# Patient Record
Sex: FEMALE | Race: WHITE | NOT HISPANIC OR LATINO | Employment: FULL TIME | URBAN - METROPOLITAN AREA
[De-identification: names, ages, dates, MRNs, and addresses within clinical notes are randomized per-mention and may not be internally consistent; named-entity substitution may affect disease eponyms.]

---

## 2017-03-31 ENCOUNTER — TELEPHONE (OUTPATIENT)
Dept: NEUROLOGY | Facility: CLINIC | Age: 22
End: 2017-03-31

## 2018-04-06 PROBLEM — F34.1 PRIMARY DYSTHYMIA: Status: ACTIVE | Noted: 2018-04-06

## 2018-04-09 ENCOUNTER — HOSPITAL ENCOUNTER (EMERGENCY)
Facility: HOSPITAL | Age: 23
Discharge: HOME | End: 2018-04-09
Attending: EMERGENCY MEDICINE

## 2018-04-09 VITALS
HEIGHT: 66 IN | SYSTOLIC BLOOD PRESSURE: 123 MMHG | OXYGEN SATURATION: 100 % | HEART RATE: 88 BPM | RESPIRATION RATE: 18 BRPM | BODY MASS INDEX: 20.09 KG/M2 | DIASTOLIC BLOOD PRESSURE: 74 MMHG | TEMPERATURE: 99 F | WEIGHT: 125 LBS

## 2018-04-09 DIAGNOSIS — A59.01 TRICHOMONAS VAGINITIS: Primary | ICD-10-CM

## 2018-04-09 DIAGNOSIS — N39.0 URINARY TRACT INFECTION WITHOUT HEMATURIA, SITE UNSPECIFIED: ICD-10-CM

## 2018-04-09 LAB
B-HCG UR QL: NEGATIVE
BACTERIA #/AREA URNS HPF: ABNORMAL /HPF
BILIRUB UR QL STRIP: NEGATIVE
CLARITY UR: ABNORMAL
COLOR UR: YELLOW
GLUCOSE UR QL STRIP: NEGATIVE
HGB UR QL STRIP: ABNORMAL
HYALINE CASTS #/AREA URNS LPF: ABNORMAL /LPF
KETONES UR QL STRIP: ABNORMAL
LEUKOCYTE ESTERASE UR QL STRIP: ABNORMAL
MICROSCOPIC COMMENT: ABNORMAL
NITRITE UR QL STRIP: NEGATIVE
PH UR STRIP: 6 [PH] (ref 5–8)
PROT UR QL STRIP: ABNORMAL
RBC #/AREA URNS HPF: 4 /HPF (ref 0–4)
SP GR UR STRIP: 1.02 (ref 1–1.03)
SQUAMOUS #/AREA URNS HPF: 25 /HPF
TRICHOMONAS UR QL MICRO: ABNORMAL
URN SPEC COLLECT METH UR: ABNORMAL
UROBILINOGEN UR STRIP-ACNC: NEGATIVE EU/DL
WBC #/AREA URNS HPF: 100 /HPF (ref 0–5)
WBC CLUMPS URNS QL MICRO: ABNORMAL

## 2018-04-09 PROCEDURE — 81025 URINE PREGNANCY TEST: CPT

## 2018-04-09 PROCEDURE — 81000 URINALYSIS NONAUTO W/SCOPE: CPT

## 2018-04-09 PROCEDURE — 96372 THER/PROPH/DIAG INJ SC/IM: CPT

## 2018-04-09 PROCEDURE — 63600175 PHARM REV CODE 636 W HCPCS: Mod: JG | Performed by: EMERGENCY MEDICINE

## 2018-04-09 PROCEDURE — 99283 EMERGENCY DEPT VISIT LOW MDM: CPT | Mod: 25

## 2018-04-09 RX ORDER — AZITHROMYCIN 250 MG/1
500 TABLET, FILM COATED ORAL DAILY
Qty: 5 TABLET | Refills: 0 | Status: SHIPPED | OUTPATIENT
Start: 2018-04-09 | End: 2020-07-13

## 2018-04-09 RX ORDER — METRONIDAZOLE 500 MG/1
500 TABLET ORAL 3 TIMES DAILY
Qty: 21 TABLET | Refills: 0 | Status: SHIPPED | OUTPATIENT
Start: 2018-04-09 | End: 2018-04-16

## 2018-04-09 RX ADMIN — PENICILLIN G BENZATHINE 1.2 MILLION UNITS: 1200000 INJECTION, SUSPENSION INTRAMUSCULAR at 09:04

## 2018-04-09 NOTE — ED PROVIDER NOTES
"Encounter Date: 4/9/2018       History     Chief Complaint   Patient presents with    Sore Throat     bumps in back of throat. Pt states "I have either strep or STD." (since saturday)    Female  Problem     Pt states "Bumps and lesions noted to vaginal area. I have a yellowish/grey discharge." onset Tuesday spread over the past 6 days.      Pt is 21 yo WF with c/o sore throat and vaginal pain with discharge. Discharge is gray/white with an odur. States she thinks she might have an STD after having unprotected intercourse a week ago. Pt on adm has a LG fever.           Review of patient's allergies indicates:  No Known Allergies  No past medical history on file.  No past surgical history on file.  No family history on file.  Social History   Substance Use Topics    Smoking status: Not on file    Smokeless tobacco: Not on file    Alcohol use Not on file     Review of Systems   Constitutional: Positive for fever.   HENT: Positive for sore throat.    Genitourinary: Positive for dyspareunia, vaginal discharge and vaginal pain.   All other systems reviewed and are negative.      Physical Exam     Initial Vitals [04/09/18 0844]   BP Pulse Resp Temp SpO2   (!) 119/93 108 18 99.1 °F (37.3 °C) 100 %      MAP       101.67         Physical Exam    Nursing note and vitals reviewed.  Constitutional: She appears well-developed and well-nourished.   HENT:   Head: Normocephalic and atraumatic.   Right Ear: External ear normal.   Left Ear: External ear normal.   Nose: Nose normal.   Mouth/Throat: Mucous membranes are normal. No oral lesions. Normal dentition. Oropharyngeal exudate, posterior oropharyngeal edema and posterior oropharyngeal erythema present.       Eyes: Conjunctivae and EOM are normal. Pupils are equal, round, and reactive to light.   Neck: Normal range of motion. Neck supple.   Cardiovascular: Normal rate, regular rhythm, normal heart sounds and intact distal pulses.   No murmur heard.  Pulmonary/Chest: Breath " sounds normal.   Abdominal: Soft. Bowel sounds are normal. There is no tenderness.   Genitourinary:       Pelvic exam was performed with patient in the knee-chest position. There is tenderness on the right labia. There is tenderness on the left labia.   Musculoskeletal: Normal range of motion.   Neurological: She is alert and oriented to person, place, and time. She has normal strength and normal reflexes.   Skin: Skin is warm and dry. Capillary refill takes less than 2 seconds.   Psychiatric: She has a normal mood and affect. Her behavior is normal. Judgment and thought content normal.         ED Course   Procedures  Labs Reviewed - No data to display                               Clinical Impression:   The primary encounter diagnosis was Trichomonas vaginitis. A diagnosis of Urinary tract infection without hematuria, site unspecified was also pertinent to this visit.                           Zafar Raymundo MD  04/09/18 [id number]

## 2018-09-13 ENCOUNTER — TELEPHONE (OUTPATIENT)
Dept: OBSTETRICS AND GYNECOLOGY | Facility: CLINIC | Age: 23
End: 2018-09-13

## 2018-09-13 NOTE — TELEPHONE ENCOUNTER
Returned call to number [phone] pt was not available I was given another number [phone] this number was busy.  I also tried to call mobile lucius cronin on demographics but there is no answer.              ----- Message from Jimena Carlisle sent at 9/13/2018 11:46 AM CDT -----  Contact: pt   Can the clinic reply in [facility]: No     Who Called: DARRIUS HOFFMANN [[id number]]     Date of Positive Preg Test:  6/29/18     1st day of Last Menstrual Cycle: 4/24/18     List Any Difficulties:  None      What Number to Call Back: [phone].

## 2018-09-18 ENCOUNTER — TELEPHONE (OUTPATIENT)
Dept: OBSTETRICS AND GYNECOLOGY | Facility: CLINIC | Age: 23
End: 2018-09-18

## 2018-09-18 NOTE — TELEPHONE ENCOUNTER
Returned call to pt.  Left vm message advising pt to call clinic       ----- Message -----  From: Nga Piper  Sent: 9/14/2018   3:35 PM  To: [facility] Clinical Staff    Name of Who is Calling: Diane      What is the request in detail: Patient was returning a missed call back from the staff      Can the clinic reply by [facility]: no      What Number to Call Back if not in [facility]: [phone]

## 2019-03-05 ENCOUNTER — HOSPITAL ENCOUNTER (EMERGENCY)
Facility: HOSPITAL | Age: 24
Discharge: HOME | End: 2019-03-05
Attending: EMERGENCY MEDICINE
Payer: MEDICAID

## 2019-03-05 VITALS
DIASTOLIC BLOOD PRESSURE: 85 MMHG | BODY MASS INDEX: 21.86 KG/M2 | TEMPERATURE: 98 F | HEIGHT: 66 IN | RESPIRATION RATE: 18 BRPM | WEIGHT: 136 LBS | HEART RATE: 85 BPM | OXYGEN SATURATION: 97 % | SYSTOLIC BLOOD PRESSURE: 113 MMHG

## 2019-03-05 DIAGNOSIS — J02.0 PHARYNGITIS, STREPTOCOCCAL, ACUTE: Primary | ICD-10-CM

## 2019-03-05 LAB — DEPRECATED S PYO AG THROAT QL EIA: POSITIVE

## 2019-03-05 PROCEDURE — 99284 EMERGENCY DEPT VISIT MOD MDM: CPT | Mod: 25

## 2019-03-05 PROCEDURE — 63600175 PHARM REV CODE 636 W HCPCS: Mod: JG | Performed by: EMERGENCY MEDICINE

## 2019-03-05 PROCEDURE — 87880 STREP A ASSAY W/OPTIC: CPT

## 2019-03-05 PROCEDURE — 96372 THER/PROPH/DIAG INJ SC/IM: CPT

## 2019-03-05 RX ORDER — PREDNISONE 10 MG/1
40 TABLET ORAL
Status: COMPLETED | OUTPATIENT
Start: 2019-03-05 | End: 2019-03-05

## 2019-03-05 RX ADMIN — PENICILLIN G BENZATHINE 2.4 MILLION UNITS: 1200000 INJECTION, SUSPENSION INTRAMUSCULAR at 09:03

## 2019-03-05 RX ADMIN — PREDNISONE 40 MG: 10 TABLET ORAL at 09:03

## 2019-03-05 NOTE — ED PROVIDER NOTES
Encounter Date: 3/5/2019       History     Chief Complaint   Patient presents with    Sore Throat     A 23-year-old female complains of sore throat onset in the past 24-36 hr  Reports some ear discomfort  Reports some congestion  No significant cough  No abdominal pain nausea vomiting diarrhea  She is 1 month postpartum    She had known exposure strep pharyngitis some 2-3 weeks ago              Review of patient's allergies indicates:  No Known Allergies  History reviewed. No pertinent past medical history.  Past Surgical History:   Procedure Laterality Date    APPENDECTOMY       No family history on file.  Social History     Tobacco Use    Smoking status: Never Smoker    Smokeless tobacco: Never Used   Substance Use Topics    Alcohol use: No     Frequency: Never    Drug use: No     Review of Systems   Constitutional: Negative for activity change, appetite change, chills, diaphoresis, fatigue and fever.   HENT: Positive for congestion, ear pain, sneezing and sore throat. Negative for dental problem, drooling, ear discharge, facial swelling, mouth sores, nosebleeds, postnasal drip, rhinorrhea, sinus pressure, sinus pain, trouble swallowing and voice change.    Eyes: Negative for discharge and redness.   Respiratory: Negative.    Cardiovascular: Negative.    Gastrointestinal: Negative.    Genitourinary: Negative.    Musculoskeletal: Negative.    Skin: Negative.    Neurological: Negative for headaches.   Hematological: Negative.    All other systems reviewed and are negative.      Physical Exam     Initial Vitals [03/05/19 0829]   BP Pulse Resp Temp SpO2   113/85 85 18 97.8 °F (36.6 °C) 97 %      MAP       --         Physical Exam    Nursing note and vitals reviewed.  Constitutional: She appears well-developed and well-nourished. She is not diaphoretic. No distress.   HENT:   Head: Normocephalic and atraumatic.   Right Ear: Tympanic membrane, external ear and ear canal normal. Tympanic membrane is not  erythematous and not bulging. No middle ear effusion.   Left Ear: Tympanic membrane, external ear and ear canal normal. Tympanic membrane is not erythematous and not bulging.  No middle ear effusion.   Nose: Nose normal.   Mouth/Throat: Uvula is midline and mucous membranes are normal. Posterior oropharyngeal edema and posterior oropharyngeal erythema present. No oropharyngeal exudate.   Eyes: Conjunctivae and EOM are normal. Pupils are equal, round, and reactive to light. Right eye exhibits no discharge. Left eye exhibits no discharge. No scleral icterus.   Neck: Normal range of motion. Neck supple.   Cardiovascular: Normal rate, regular rhythm, normal heart sounds and intact distal pulses. Exam reveals no gallop and no friction rub.    No murmur heard.  Pulmonary/Chest: Breath sounds normal. No respiratory distress. She has no wheezes. She has no rhonchi. She has no rales.   Abdominal: Soft. Bowel sounds are normal. She exhibits no distension and no mass. There is no tenderness. There is no rebound and no guarding.   Musculoskeletal: Normal range of motion. She exhibits no edema or tenderness.   Lymphadenopathy:     She has no cervical adenopathy.   Neurological: She is alert and oriented to person, place, and time. She has normal strength. No cranial nerve deficit or sensory deficit.   Skin: Skin is warm and dry. Capillary refill takes less than 2 seconds. No rash noted. No erythema. No pallor.   Psychiatric: She has a normal mood and affect. Her behavior is normal. Judgment and thought content normal.         ED Course   Procedures  Labs Reviewed   THROAT SCREEN, RAPID - Abnormal; Notable for the following components:       Result Value    Rapid Strep A Screen Positive (*)     All other components within normal limits          Imaging Results    None                  Treated with Bicillin 2.4 million units IM  Stable discharge as per AVS              Clinical Impression:       ICD-10-CM ICD-9-CM   1.  Pharyngitis, streptococcal, acute J02.0 034.0         Disposition:   Disposition: Discharged  Condition: Stable                        Kevin Gomez MD  03/05/19 0905

## 2019-03-05 NOTE — DISCHARGE INSTRUCTIONS
Bicillin 2.4 million units IM x1  Prednisone 40 mg x1    Review strep pharyngitis discharge instruction sheet    Return to the emergency department if any acute worsening

## 2020-03-09 LAB
CHLAMYDIA TRACHOMATIS AMP DNA: NOT DETECTED
TRICHOMONAS VAGINALIS: DETECTED

## 2020-03-10 LAB
HEP C VIRUS AB: NON REACTIVE
HIV: NON REACTIVE

## 2020-09-12 ENCOUNTER — HOSPITAL ENCOUNTER (INPATIENT)
Facility: HOSPITAL | Age: 25
LOS: 2 days | Discharge: HOME | DRG: 440 | End: 2020-09-14
Attending: INTERNAL MEDICINE | Admitting: FAMILY MEDICINE
Payer: COMMERCIAL

## 2020-09-12 DIAGNOSIS — K85.00 IDIOPATHIC ACUTE PANCREATITIS WITHOUT INFECTION OR NECROSIS: Primary | ICD-10-CM

## 2020-09-12 DIAGNOSIS — R11.2 NAUSEA & VOMITING: ICD-10-CM

## 2020-09-12 DIAGNOSIS — K85.90 PANCREATITIS, ACUTE: ICD-10-CM

## 2020-09-12 LAB
ALBUMIN SERPL BCP-MCNC: 4.2 G/DL (ref 3.5–5.2)
ALP SERPL-CCNC: 46 U/L (ref 55–135)
ALT SERPL W/O P-5'-P-CCNC: 15 U/L (ref 10–44)
AMYLASE SERPL-CCNC: 154 U/L (ref 20–110)
ANION GAP SERPL CALC-SCNC: 16 MMOL/L (ref 8–16)
AST SERPL-CCNC: 19 U/L (ref 10–40)
B-HCG UR QL: NEGATIVE
BASOPHILS # BLD AUTO: 0.02 K/UL (ref 0–0.2)
BASOPHILS NFR BLD: 0.3 % (ref 0–1.9)
BILIRUB SERPL-MCNC: 0.4 MG/DL (ref 0.1–1)
BILIRUB UR QL STRIP: NEGATIVE
BUN SERPL-MCNC: 15 MG/DL (ref 6–20)
CALCIUM SERPL-MCNC: 9.2 MG/DL (ref 8.7–10.5)
CHLORIDE SERPL-SCNC: 106 MMOL/L (ref 95–110)
CLARITY UR: CLEAR
CO2 SERPL-SCNC: 22 MMOL/L (ref 23–29)
COLOR UR: YELLOW
CREAT SERPL-MCNC: 0.8 MG/DL (ref 0.5–1.4)
CRP SERPL-MCNC: 0.12 MG/DL (ref 0–0.75)
DIFFERENTIAL METHOD: ABNORMAL
EOSINOPHIL # BLD AUTO: 0.1 K/UL (ref 0–0.5)
EOSINOPHIL NFR BLD: 1.9 % (ref 0–8)
ERYTHROCYTE [DISTWIDTH] IN BLOOD BY AUTOMATED COUNT: 13.7 % (ref 11.5–14.5)
EST. GFR  (AFRICAN AMERICAN): >60 ML/MIN/1.73 M^2
EST. GFR  (NON AFRICAN AMERICAN): >60 ML/MIN/1.73 M^2
GLUCOSE SERPL-MCNC: 97 MG/DL (ref 70–110)
GLUCOSE UR QL STRIP: NEGATIVE
HCT VFR BLD AUTO: 36.4 % (ref 37–48.5)
HGB BLD-MCNC: 11.9 G/DL (ref 12–16)
HGB UR QL STRIP: ABNORMAL
IMM GRANULOCYTES # BLD AUTO: 0.02 K/UL (ref 0–0.04)
IMM GRANULOCYTES NFR BLD AUTO: 0.3 % (ref 0–0.5)
KETONES UR QL STRIP: NEGATIVE
LEUKOCYTE ESTERASE UR QL STRIP: NEGATIVE
LIPASE SERPL-CCNC: 132 U/L (ref 4–60)
LYMPHOCYTES # BLD AUTO: 2.3 K/UL (ref 1–4.8)
LYMPHOCYTES NFR BLD: 33.9 % (ref 18–48)
MCH RBC QN AUTO: 29 PG (ref 27–31)
MCHC RBC AUTO-ENTMCNC: 32.7 G/DL (ref 32–36)
MCV RBC AUTO: 89 FL (ref 82–98)
MICROSCOPIC COMMENT: NORMAL
MONOCYTES # BLD AUTO: 0.4 K/UL (ref 0.3–1)
MONOCYTES NFR BLD: 6.5 % (ref 4–15)
NEUTROPHILS # BLD AUTO: 3.9 K/UL (ref 1.8–7.7)
NEUTROPHILS NFR BLD: 57.1 % (ref 38–73)
NITRITE UR QL STRIP: NEGATIVE
NRBC BLD-RTO: 0 /100 WBC
PH UR STRIP: 6 [PH] (ref 5–8)
PLATELET # BLD AUTO: 239 K/UL (ref 150–350)
PMV BLD AUTO: 9.4 FL (ref 9.2–12.9)
POTASSIUM SERPL-SCNC: 3.7 MMOL/L (ref 3.5–5.1)
PROT SERPL-MCNC: 7.8 G/DL (ref 6–8.4)
PROT UR QL STRIP: NEGATIVE
RBC # BLD AUTO: 4.11 M/UL (ref 4–5.4)
RBC #/AREA URNS HPF: 4 /HPF (ref 0–4)
SARS-COV-2 RDRP RESP QL NAA+PROBE: NEGATIVE
SODIUM SERPL-SCNC: 144 MMOL/L (ref 136–145)
SP GR UR STRIP: 1.01 (ref 1–1.03)
URN SPEC COLLECT METH UR: ABNORMAL
UROBILINOGEN UR STRIP-ACNC: NEGATIVE EU/DL
WBC # BLD AUTO: 6.82 K/UL (ref 3.9–12.7)

## 2020-09-12 PROCEDURE — 80053 COMPREHEN METABOLIC PANEL: CPT

## 2020-09-12 PROCEDURE — 81000 URINALYSIS NONAUTO W/SCOPE: CPT

## 2020-09-12 PROCEDURE — 83690 ASSAY OF LIPASE: CPT

## 2020-09-12 PROCEDURE — A9698 NON-RAD CONTRAST MATERIALNOC: HCPCS | Performed by: INTERNAL MEDICINE

## 2020-09-12 PROCEDURE — 74177 CT ABDOMEN PELVIS WITH CONTRAST: ICD-10-PCS | Mod: 26,,, | Performed by: RADIOLOGY

## 2020-09-12 PROCEDURE — 76705 ECHO EXAM OF ABDOMEN: CPT | Mod: 26,,, | Performed by: RADIOLOGY

## 2020-09-12 PROCEDURE — 74177 CT ABD & PELVIS W/CONTRAST: CPT | Mod: TC

## 2020-09-12 PROCEDURE — 99222 PR INITIAL HOSPITAL CARE,LEVL II: ICD-10-PCS | Mod: ,,, | Performed by: FAMILY MEDICINE

## 2020-09-12 PROCEDURE — 25000003 PHARM REV CODE 250: Performed by: INTERNAL MEDICINE

## 2020-09-12 PROCEDURE — 76705 US ABDOMEN LIMITED: ICD-10-PCS | Mod: 26,,, | Performed by: RADIOLOGY

## 2020-09-12 PROCEDURE — 63600175 PHARM REV CODE 636 W HCPCS: Performed by: FAMILY MEDICINE

## 2020-09-12 PROCEDURE — 11000001 HC ACUTE MED/SURG PRIVATE ROOM

## 2020-09-12 PROCEDURE — 63600175 PHARM REV CODE 636 W HCPCS: Performed by: INTERNAL MEDICINE

## 2020-09-12 PROCEDURE — 86140 C-REACTIVE PROTEIN: CPT

## 2020-09-12 PROCEDURE — 99285 EMERGENCY DEPT VISIT HI MDM: CPT | Mod: 25

## 2020-09-12 PROCEDURE — 25500020 PHARM REV CODE 255: Performed by: INTERNAL MEDICINE

## 2020-09-12 PROCEDURE — 99222 1ST HOSP IP/OBS MODERATE 55: CPT | Mod: ,,, | Performed by: FAMILY MEDICINE

## 2020-09-12 PROCEDURE — 25000003 PHARM REV CODE 250: Performed by: FAMILY MEDICINE

## 2020-09-12 PROCEDURE — 81025 URINE PREGNANCY TEST: CPT

## 2020-09-12 PROCEDURE — 74177 CT ABD & PELVIS W/CONTRAST: CPT | Mod: 26,,, | Performed by: RADIOLOGY

## 2020-09-12 PROCEDURE — 85025 COMPLETE CBC W/AUTO DIFF WBC: CPT

## 2020-09-12 PROCEDURE — 76705 ECHO EXAM OF ABDOMEN: CPT | Mod: TC

## 2020-09-12 PROCEDURE — U0002 COVID-19 LAB TEST NON-CDC: HCPCS

## 2020-09-12 PROCEDURE — 82150 ASSAY OF AMYLASE: CPT

## 2020-09-12 RX ORDER — METHOCARBAMOL 500 MG/1
500 TABLET, FILM COATED ORAL 4 TIMES DAILY
COMMUNITY
End: 2020-09-21 | Stop reason: CLARIF

## 2020-09-12 RX ORDER — KETOROLAC TROMETHAMINE 10 MG/1
10 TABLET, FILM COATED ORAL EVERY 6 HOURS
Status: ON HOLD | COMMUNITY
End: 2020-09-14 | Stop reason: HOSPADM

## 2020-09-12 RX ORDER — HYDROMORPHONE HYDROCHLORIDE 2 MG/ML
2 INJECTION, SOLUTION INTRAMUSCULAR; INTRAVENOUS; SUBCUTANEOUS EVERY 6 HOURS PRN
Status: DISCONTINUED | OUTPATIENT
Start: 2020-09-12 | End: 2020-09-14 | Stop reason: HOSPADM

## 2020-09-12 RX ORDER — ACETAMINOPHEN 325 MG/1
650 TABLET ORAL EVERY 8 HOURS PRN
Status: DISCONTINUED | OUTPATIENT
Start: 2020-09-12 | End: 2020-09-14 | Stop reason: HOSPADM

## 2020-09-12 RX ORDER — SODIUM CHLORIDE 9 MG/ML
1000 INJECTION, SOLUTION INTRAVENOUS CONTINUOUS
Status: DISCONTINUED | OUTPATIENT
Start: 2020-09-12 | End: 2020-09-14 | Stop reason: HOSPADM

## 2020-09-12 RX ORDER — ONDANSETRON 2 MG/ML
4 INJECTION INTRAMUSCULAR; INTRAVENOUS EVERY 8 HOURS PRN
Status: DISCONTINUED | OUTPATIENT
Start: 2020-09-12 | End: 2020-09-14 | Stop reason: HOSPADM

## 2020-09-12 RX ORDER — SODIUM CHLORIDE 0.9 % (FLUSH) 0.9 %
10 SYRINGE (ML) INJECTION
Status: DISCONTINUED | OUTPATIENT
Start: 2020-09-12 | End: 2020-09-14 | Stop reason: HOSPADM

## 2020-09-12 RX ORDER — SODIUM CHLORIDE 9 MG/ML
1000 INJECTION, SOLUTION INTRAVENOUS CONTINUOUS
Status: ACTIVE | OUTPATIENT
Start: 2020-09-12 | End: 2020-09-12

## 2020-09-12 RX ADMIN — SODIUM CHLORIDE 1000 ML: 0.9 INJECTION, SOLUTION INTRAVENOUS at 02:09

## 2020-09-12 RX ADMIN — IOHEXOL 500 ML: 9 SOLUTION ORAL at 12:09

## 2020-09-12 RX ADMIN — SODIUM CHLORIDE 1000 ML: 0.9 INJECTION, SOLUTION INTRAVENOUS at 09:09

## 2020-09-12 RX ADMIN — HYDROMORPHONE HYDROCHLORIDE 2 MG: 2 INJECTION, SOLUTION INTRAMUSCULAR; INTRAVENOUS; SUBCUTANEOUS at 07:09

## 2020-09-12 RX ADMIN — ERTAPENEM SODIUM 1 G: 1 INJECTION, POWDER, LYOPHILIZED, FOR SOLUTION INTRAMUSCULAR; INTRAVENOUS at 02:09

## 2020-09-12 RX ADMIN — ONDANSETRON HYDROCHLORIDE 4 MG: 2 SOLUTION INTRAMUSCULAR; INTRAVENOUS at 04:09

## 2020-09-12 RX ADMIN — IOHEXOL 75 ML: 350 INJECTION, SOLUTION INTRAVENOUS at 12:09

## 2020-09-12 NOTE — ED PROVIDER NOTES
Encounter Date: 9/12/2020       History     Chief Complaint   Patient presents with    Abdominal Pain     Patient complaining of abdominal pain x10 days, seen by NP at her job.     Patient comes in with right upper quadrant abdominal pain.  States that the pain is been there for approximately 1-2 weeks.  She saw a local provider at the [employer] where she works and they thought it was a pulled muscle.  She was treated with muscle relaxants and anti-inflammatory medications.  It did not improve.  Pain is been constant.  It does increase and decrease.  No fever chills sweats.  She her appendix has been removed.  She is having regular periods.  She has no fever chills sweats.  Right upper quadrant is exquisitely tender to touch.        Review of patient's allergies indicates:  No Known Allergies  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     Past Surgical History:   Procedure Laterality Date    APPENDECTOMY       Family History   Problem Relation Age of Onset    Arthritis Mother     Ovarian cancer Maternal Aunt      Social History     Tobacco Use    Smoking status: Former Smoker    Smokeless tobacco: Never Used   Substance Use Topics    Alcohol use: Yes     Frequency: Never     Comment: occ    Drug use: No     Review of Systems   Constitutional: Negative for fever.   HENT: Negative for sore throat.    Respiratory: Negative for shortness of breath.    Cardiovascular: Negative for chest pain.   Gastrointestinal: Positive for abdominal pain and nausea.   Genitourinary: Negative for dysuria.   Musculoskeletal: Negative for back pain.   Skin: Negative for rash.   Neurological: Negative for weakness.   Hematological: Does not bruise/bleed easily.   All other systems reviewed and are negative.      Physical Exam     Initial Vitals [09/12/20 0933]   BP Pulse Resp Temp SpO2   (!) 152/109 (!) 116 20 98.4 °F (36.9 °C) 99 %      MAP       --         Physical Exam    Nursing  note and vitals reviewed.  Constitutional: Vital signs are normal. She appears well-developed and well-nourished. She is active and cooperative.   HENT:   Head: Normocephalic and atraumatic.   Eyes: Conjunctivae, EOM and lids are normal. Pupils are equal, round, and reactive to light. Lids are everted and swept, no foreign bodies found.   Neck: Trachea normal, normal range of motion and full passive range of motion without pain. Neck supple.   Cardiovascular: Normal rate, regular rhythm, S1 normal, S2 normal, normal heart sounds, intact distal pulses and normal pulses.  No extrasystoles are present.    Pulmonary/Chest: Breath sounds normal.   Abdominal: Soft. Normal appearance and bowel sounds are normal. There is abdominal tenderness. There is rebound and guarding.   Musculoskeletal: Normal range of motion.   Neurological: She is alert. She has normal reflexes. GCS eye subscore is 4. GCS verbal subscore is 5. GCS motor subscore is 6.   Skin: Skin is warm, dry and intact. Capillary refill takes less than 2 seconds.   Psychiatric: She has a normal mood and affect. Her speech is normal and behavior is normal. Cognition and memory are normal.         ED Course   Procedures  Labs Reviewed   CBC W/ AUTO DIFFERENTIAL - Abnormal; Notable for the following components:       Result Value    Hemoglobin 11.9 (*)     Hematocrit 36.4 (*)     All other components within normal limits   COMPREHENSIVE METABOLIC PANEL - Abnormal; Notable for the following components:    CO2 22 (*)     Alkaline Phosphatase 46 (*)     All other components within normal limits   AMYLASE - Abnormal; Notable for the following components:    Amylase 154 (*)     All other components within normal limits   LIPASE - Abnormal; Notable for the following components:    Lipase 132 (*)     All other components within normal limits   URINALYSIS, REFLEX TO URINE CULTURE - Abnormal; Notable for the following components:    Occult Blood UA 1+ (*)     All other  components within normal limits    Narrative:     Specimen Source->Urine   C-REACTIVE PROTEIN   URINALYSIS MICROSCOPIC    Narrative:     Specimen Source->Urine   SARS-COV-2 RNA AMPLIFICATION, QUAL   PREGNANCY TEST, URINE RAPID   PREGNANCY TEST, URINE RAPID    Narrative:     Specimen Source->Urine          Imaging Results          CT Abdomen Pelvis With Contrast (Final result)  Result time 09/12/20 13:28:47    Final result by Agapito Romero MD (09/12/20 13:28:47)                 Impression:      1. Bilateral non-obstructing nephrolithiasis.  2. Nonspecific prominence of the pancreatic tail, possibly due to the patient's young age.  Pancreatitis would be difficult to exclude.  No secondary imaging findings of pancreatitis.  No pseudocyst formation or pancreatic mass.      Electronically signed by: Igor Romero MD  Date:    09/12/2020  Time:    13:28             Narrative:    EXAMINATION:  CT ABDOMEN PELVIS WITH CONTRAST    CLINICAL HISTORY:  Pancreatitis, acute, initial episode (Ped 0-18y);    TECHNIQUE:  Low dose axial images, sagittal and coronal reformations were obtained from the lung bases to the pubic symphysis following the IV administration of 75 mL of Omnipaque 350 and the oral administration of 1000 mL of Omnipaque 9.    COMPARISON:  None.    FINDINGS:  The visualized lower mediastinum is unremarkable.  The lung bases are clear.    The liver, gallbladder, spleen, stomach, duodenal C-loop, and adrenal glands are unremarkable.  The portal vein is patent.  No biliary dilatation.  The pancreatic tail appears somewhat prominent in size, possibly due to the patient's young age.  Mild pancreatitis could theoretically produce a similar appearance.  No pseudocyst formation or pancreatic mass identified.  No peripancreatic fluid or fluid in the left or right anterior pararenal space.  The abdominal aorta is not aneurysmal.  No bulky periaortic or retroperitoneal lymphadenopathy.    The most significant  abnormality relates to the presence of multiple bilateral non-obstructing renal stones with at least 3 stones noted in each kidney.  The largest stone in the right kidney measures 7 mm in the lower pole collecting system on series 4, image 24.  The largest stone in the left kidney measures 5 mm in the left renal midpole on series 4, image 28.  No definite hydronephrosis.  The ureters are normal in caliber and course without evidence of stones, and the urinary bladder is unremarkable.  The uterus and ovaries show nothing unusual for age.  There is a small volume of free fluid present within the cul de sac/right posterolateral pelvis on series 2, image 69, not unexpected given the patient's young age and premenopausal status.    There is a moderate volume of stool present within the right colon and transverse colon.  No evidence of high-grade bowel obstruction or bowel inflammatory change.  No inguinal lymphadenopathy or inguinal hernia.    There is no significant osseous abnormality appreciated.                               US Abdomen Limited (Final result)  Result time 09/12/20 11:11:26    Final result by Agapito Romero MD (09/12/20 11:11:26)                 Impression:      No significant sonographic abnormality involving the visualized abdomen.      Electronically signed by: Igor Romero MD  Date:    09/12/2020  Time:    11:11             Narrative:    EXAMINATION:  US ABDOMEN LIMITED    CLINICAL HISTORY:  RUQ pain;.    TECHNIQUE:  Limited ultrasound evaluation of the abdomen (including the pancreas,IVC, liver, gallbladder, common bile duct, and right kidney) was performed utilizing grayscale and color flow imaging.    COMPARISON:  None.    FINDINGS:  Pancreas: The pancreas is normal in echogenicity without focal mass or definite pseudocyst where visualized.    Liver: The liver is elongated in length measuring 18 cm in sagittal dimension, likely due to a Riedel's lobe variant.  The liver demonstrates a  normal homogeneous parenchymal echotexture without fatty infiltration.  No focal hepatic lesions are seen. The portal vein is patent and shows hepatopetal flow.  The IVC is patent where visualized.    Biliary system: The gallbladder shows no evidence of shadowing stones, pericholecystic fluid, or sonographic Gusman sign.  There is borderline gallbladder distension, nonspecific.  The common duct is not dilated, measuring 4 mm. No intrahepatic ductal dilatation.    Right kidney: The right kidney is normal in size measuring 10.3 cm.No hydronephrosis, stones, or renal mass.    Miscellaneous: No ascites.                                 Medical Decision Making:   Clinical Tests:   Lab Tests: Ordered and Reviewed  The following lab test(s) were unremarkable: CBC, CMP and Lipase       <> Summary of Lab: Pancreatic enzymes are significantly elevated.  All remaining studies are basically normal including signs of inflammation CRP.  Radiological Study: Ordered and Reviewed  ED Management:  Ultrasound and CT  are unrevealing evidence of mild pancreatitis of the distal pancreas.  No obstructing stone was seen on ultrasound.  The pancreatic duct was seen clearly as well as the common bile duct with no obvious stone formation.  The patient does have bilateral renal stones which do not appear to be obstructing however there significant in both renal pelvises.  These findings do not appear clinically to be nephrolithiasis.    Etiology of the pancreatitis is obscure.  Small stone or pancreatic divisum are possibilities.  Infectious possibilities are not likely due to the clinical presentation but do include Coxsackie virus and several other adenovirus.  COVID is negative.  Patient will be admitted for IV fluids, antibiotics, pain control, and HIDA scan on Monday.  The case was discussed with surgery as well as the hospitalist.                             Clinical Impression:       ICD-10-CM ICD-9-CM   1. Idiopathic acute pancreatitis  without infection or necrosis  K85.00 577.0   2. Pancreatitis, acute  K85.90 577.0         Disposition:   Disposition: Discharged  Condition: Stable                          Ayan Saucedo MD  09/12/20 9686

## 2020-09-12 NOTE — SUBJECTIVE & OBJECTIVE
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    Vaginitis        Past Surgical History:   Procedure Laterality Date    APPENDECTOMY         Review of patient's allergies indicates:  No Known Allergies    No current facility-administered medications on file prior to encounter.      Current Outpatient Medications on File Prior to Encounter   Medication Sig    etonogestreL-ethinyl estradioL (NUVARING) 0.12-0.015 mg/24 hr vaginal ring Place 1 each vaginally every 21 days. Insert one (1) ring vaginally and leave in place for three (3) weeks, then remove for one (1) week.    ketorolac (TORADOL) 10 mg tablet Take 10 mg by mouth every 6 (six) hours.    methocarbamoL (ROBAXIN) 500 MG Tab Take 500 mg by mouth 4 (four) times daily.    valACYclovir (VALTREX) 500 MG tablet Take 1 tablet (500 mg total) by mouth once daily.     Family History     Problem Relation (Age of Onset)    Arthritis Mother    Ovarian cancer Maternal Aunt        Tobacco Use    Smoking status: Former Smoker    Smokeless tobacco: Never Used   Substance and Sexual Activity    Alcohol use: Yes     Frequency: Never     Comment: occ    Drug use: No    Sexual activity: Yes     Birth control/protection: I.U.D.     Review of Systems   Constitutional: Negative for chills and fever.   HENT: Negative for congestion and rhinorrhea.    Eyes: Negative for visual disturbance.   Respiratory: Negative for cough and shortness of breath.    Cardiovascular: Negative for chest pain.   Gastrointestinal: Positive for abdominal pain. Negative for constipation, diarrhea, nausea and vomiting.   Genitourinary: Negative for dysuria and flank pain.   Musculoskeletal: Negative for myalgias.   Skin: Negative for color change and rash.   Neurological: Negative for weakness and headaches.   Psychiatric/Behavioral: The patient is not nervous/anxious.      Objective:     Vital Signs (Most Recent):  Temp: 98.2 °F (36.8 °C) (09/12/20  1419)  Pulse: 85 (09/12/20 1419)  Resp: 16 (09/12/20 1419)  BP: 127/87 (09/12/20 1419)  SpO2: 98 % (09/12/20 1419) Vital Signs (24h Range):  Temp:  [98.2 °F (36.8 °C)-98.4 °F (36.9 °C)] 98.2 °F (36.8 °C)  Pulse:  [] 85  Resp:  [16-20] 16  SpO2:  [98 %-99 %] 98 %  BP: (127-152)/() 127/87     Weight: 58.5 kg (129 lb)  Body mass index is 20.82 kg/m².    Physical Exam  Vitals signs and nursing note reviewed.   Constitutional:       General: She is not in acute distress.     Appearance: Normal appearance. She is well-developed. She is not diaphoretic.   HENT:      Head: Normocephalic and atraumatic.      Right Ear: External ear normal.      Left Ear: External ear normal.   Eyes:      General:         Right eye: No discharge.         Left eye: No discharge.   Cardiovascular:      Rate and Rhythm: Normal rate and regular rhythm.      Heart sounds: Normal heart sounds.   Pulmonary:      Effort: Pulmonary effort is normal.      Breath sounds: Normal breath sounds. No wheezing or rales.   Abdominal:      General: Abdomen is flat. Bowel sounds are normal.      Palpations: Abdomen is soft.      Tenderness: There is abdominal tenderness (Exquisitely tender in the right upper quadrant). There is guarding and rebound. There is no right CVA tenderness or left CVA tenderness.   Skin:     General: Skin is warm and dry.   Neurological:      Mental Status: She is alert and oriented to person, place, and time. Mental status is at baseline.   Psychiatric:         Mood and Affect: Mood normal.         Behavior: Behavior normal.         Thought Content: Thought content normal.         Judgment: Judgment normal.             Significant Labs:   CBC:   Recent Labs   Lab 09/12/20  0952   WBC 6.82   HGB 11.9*   HCT 36.4*        CMP:   Recent Labs   Lab 09/12/20  0952      K 3.7      CO2 22*   GLU 97   BUN 15   CREATININE 0.8   CALCIUM 9.2   PROT 7.8   ALBUMIN 4.2   BILITOT 0.4   ALKPHOS 46*   AST 19   ALT 15    ANIONGAP 16   EGFRNONAA >60.0     Lipase:   Recent Labs   Lab 09/12/20  0952   LIPASE 132*       Significant Imaging: I have reviewed all pertinent imaging results/findings within the past 24 hours.

## 2020-09-12 NOTE — HPI
24-year-old female with no significant past medical history presents to the ER for right upper quadrant abdominal pain that is been present for about the last week.  She saw a local provider at the [facility] about a week ago who thought was a pulled muscle because the pain was more localized to the back.  She was treated with muscle relaxants and anti-inflammatories which did not improve her pain.  Over the last few days it has become more constant in the right upper quadrant.  Denies any fever or chills.  Has been having normal bowel movements.  No emesis.  Right upper quadrant is exquisitely tender.  In the ER ultrasound and CT scan were performed.  Evidence of mild pangastritis in the distal pancreas.  No stone was found on the ultrasound.  Patient does have bilateral renal stones which are not obstructing.  Lipase was mildly elevated.  Case was discussed with general surgery who recommended treating as pancreatitis and having a HIDA scan on Monday.  Will admit for IV fluids, pain control, IV antibiotics.

## 2020-09-12 NOTE — LETTER
September 17, 2020    Diane Mcpherson  [address] [place] [address]                   [address] [facility] [place] [address]  Phone: [phone]  Fax: [phone]   September 17, 2020     Patient: Diane Mcpherson   YOB: 1995   Date of Visit: 9/12/2020       To Whom it May Concern:    Diane Mcpherson may return to work on 9/21/2020 or sooner if symptoms have resolved. No work restrictions.     Please excuse her from any work missed.    If you have any questions or concerns, please don't hesitate to call.    Sincerely,         Sheridan Pantoja RN

## 2020-09-12 NOTE — NURSING
Report received from HERB Hunt at 1408. Patient arrived to floor from ER via W/C at 1415.  Patient is AAOx4, respirations even and unlabored, VS taken.  Patient oriented to room and call light placed within reach.  No distress noted or verbalized by patient at present.

## 2020-09-12 NOTE — H&P
[facility] Hospital Medicine  History & Physical    Patient Name: Diane Mcpherson  MRN: [id number]  Admission Date: 9/12/2020  Attending Physician: Kemi Stephen MD   Primary Care Provider: Primary Doctor No         Patient information was obtained from patient and ER records.     Subjective:     Principal Problem:Pancreatitis, acute    Chief Complaint:   Chief Complaint   Patient presents with    Abdominal Pain     Patient complaining of abdominal pain x10 days, seen by NP at her job.        HPI: 24-year-old female with no significant past medical history presents to the ER for right upper quadrant abdominal pain that is been present for about the last week.  She saw a local provider at the [facility] about a week ago who thought was a pulled muscle because the pain was more localized to the back.  She was treated with muscle relaxants and anti-inflammatories which did not improve her pain.  Over the last few days it has become more constant in the right upper quadrant.  Denies any fever or chills.  Has been having normal bowel movements.  No emesis.  Right upper quadrant is exquisitely tender.  In the ER ultrasound and CT scan were performed.  Evidence of mild pangastritis in the distal pancreas.  No stone was found on the ultrasound.  Patient does have bilateral renal stones which are not obstructing.  Lipase was mildly elevated.  Case was discussed with general surgery who recommended treating as pancreatitis and having a HIDA scan on Monday.  Will admit for IV fluids, pain control, IV antibiotics.    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    Vaginitis        Past Surgical History:   Procedure Laterality Date    APPENDECTOMY         Review of patient's allergies indicates:  No Known Allergies    No current facility-administered medications on file prior to encounter.      Current Outpatient Medications on File Prior  to Encounter   Medication Sig    etonogestreL-ethinyl estradioL (NUVARING) 0.12-0.015 mg/24 hr vaginal ring Place 1 each vaginally every 21 days. Insert one (1) ring vaginally and leave in place for three (3) weeks, then remove for one (1) week.    ketorolac (TORADOL) 10 mg tablet Take 10 mg by mouth every 6 (six) hours.    methocarbamoL (ROBAXIN) 500 MG Tab Take 500 mg by mouth 4 (four) times daily.    valACYclovir (VALTREX) 500 MG tablet Take 1 tablet (500 mg total) by mouth once daily.     Family History     Problem Relation (Age of Onset)    Arthritis Mother    Ovarian cancer Maternal Aunt        Tobacco Use    Smoking status: Former Smoker    Smokeless tobacco: Never Used   Substance and Sexual Activity    Alcohol use: Yes     Frequency: Never     Comment: occ    Drug use: No    Sexual activity: Yes     Birth control/protection: I.U.D.     Review of Systems   Constitutional: Negative for chills and fever.   HENT: Negative for congestion and rhinorrhea.    Eyes: Negative for visual disturbance.   Respiratory: Negative for cough and shortness of breath.    Cardiovascular: Negative for chest pain.   Gastrointestinal: Positive for abdominal pain. Negative for constipation, diarrhea, nausea and vomiting.   Genitourinary: Negative for dysuria and flank pain.   Musculoskeletal: Negative for myalgias.   Skin: Negative for color change and rash.   Neurological: Negative for weakness and headaches.   Psychiatric/Behavioral: The patient is not nervous/anxious.      Objective:     Vital Signs (Most Recent):  Temp: 98.2 °F (36.8 °C) (09/12/20 1419)  Pulse: 85 (09/12/20 1419)  Resp: 16 (09/12/20 1419)  BP: 127/87 (09/12/20 1419)  SpO2: 98 % (09/12/20 1419) Vital Signs (24h Range):  Temp:  [98.2 °F (36.8 °C)-98.4 °F (36.9 °C)] 98.2 °F (36.8 °C)  Pulse:  [] 85  Resp:  [16-20] 16  SpO2:  [98 %-99 %] 98 %  BP: (127-152)/() 127/87     Weight: 58.5 kg (129 lb)  Body mass index is 20.82 kg/m².    Physical  Exam  Vitals signs and nursing note reviewed.   Constitutional:       General: She is not in acute distress.     Appearance: Normal appearance. She is well-developed. She is not diaphoretic.   HENT:      Head: Normocephalic and atraumatic.      Right Ear: External ear normal.      Left Ear: External ear normal.   Eyes:      General:         Right eye: No discharge.         Left eye: No discharge.   Cardiovascular:      Rate and Rhythm: Normal rate and regular rhythm.      Heart sounds: Normal heart sounds.   Pulmonary:      Effort: Pulmonary effort is normal.      Breath sounds: Normal breath sounds. No wheezing or rales.   Abdominal:      General: Abdomen is flat. Bowel sounds are normal.      Palpations: Abdomen is soft.      Tenderness: There is abdominal tenderness (Exquisitely tender in the right upper quadrant). There is guarding and rebound. There is no right CVA tenderness or left CVA tenderness.   Skin:     General: Skin is warm and dry.   Neurological:      Mental Status: She is alert and oriented to person, place, and time. Mental status is at baseline.   Psychiatric:         Mood and Affect: Mood normal.         Behavior: Behavior normal.         Thought Content: Thought content normal.         Judgment: Judgment normal.             Significant Labs:   CBC:   Recent Labs   Lab 09/12/20  0952   WBC 6.82   HGB 11.9*   HCT 36.4*        CMP:   Recent Labs   Lab 09/12/20  0952      K 3.7      CO2 22*   GLU 97   BUN 15   CREATININE 0.8   CALCIUM 9.2   PROT 7.8   ALBUMIN 4.2   BILITOT 0.4   ALKPHOS 46*   AST 19   ALT 15   ANIONGAP 16   EGFRNONAA >60.0     Lipase:   Recent Labs   Lab 09/12/20  0952   LIPASE 132*       Significant Imaging: I have reviewed all pertinent imaging results/findings within the past 24 hours.    Assessment/Plan:     * Pancreatitis, acute  Exquisitely tender in the right upper quadrant, right upper quadrant ultrasound does not show any signs of cholecystitis  Lipase  elevated, afebrile, vitals stable  IV fluids:  Normal saline at 150 cc an hour  IV Invanz  Pain control  NPO for now, advanced diet as tolerated  Follow-up labs tomorrow  HIDA scan on Monday  Follow-up general surgery recommendations, appreciate their assistance.        VTE Risk Mitigation (From admission, onward)    None             Vignesh Tapia MD  Department of Hospital Medicine   [facility]

## 2020-09-12 NOTE — ASSESSMENT & PLAN NOTE
Exquisitely tender in the right upper quadrant, right upper quadrant ultrasound does not show any signs of cholecystitis  Lipase elevated, afebrile, vitals stable  IV fluids:  Normal saline at 150 cc an hour  IV Invanz  Pain control  NPO for now, advanced diet as tolerated  Follow-up labs tomorrow  HIDA scan on Monday  Follow-up general surgery recommendations, appreciate their assistance.

## 2020-09-13 LAB
ALBUMIN SERPL BCP-MCNC: 3.9 G/DL (ref 3.5–5.2)
ALP SERPL-CCNC: 50 U/L (ref 55–135)
ALT SERPL W/O P-5'-P-CCNC: 15 U/L (ref 10–44)
ANION GAP SERPL CALC-SCNC: 10 MMOL/L (ref 8–16)
AST SERPL-CCNC: 19 U/L (ref 10–40)
BASOPHILS # BLD AUTO: 0.02 K/UL (ref 0–0.2)
BASOPHILS NFR BLD: 0.2 % (ref 0–1.9)
BILIRUB SERPL-MCNC: 0.9 MG/DL (ref 0.1–1)
BUN SERPL-MCNC: 10 MG/DL (ref 6–20)
CALCIUM SERPL-MCNC: 8.5 MG/DL (ref 8.7–10.5)
CHLORIDE SERPL-SCNC: 108 MMOL/L (ref 95–110)
CO2 SERPL-SCNC: 22 MMOL/L (ref 23–29)
CREAT SERPL-MCNC: 0.7 MG/DL (ref 0.5–1.4)
DIFFERENTIAL METHOD: ABNORMAL
EOSINOPHIL # BLD AUTO: 0.1 K/UL (ref 0–0.5)
EOSINOPHIL NFR BLD: 0.9 % (ref 0–8)
ERYTHROCYTE [DISTWIDTH] IN BLOOD BY AUTOMATED COUNT: 13.6 % (ref 11.5–14.5)
EST. GFR  (AFRICAN AMERICAN): >60 ML/MIN/1.73 M^2
EST. GFR  (NON AFRICAN AMERICAN): >60 ML/MIN/1.73 M^2
GLUCOSE SERPL-MCNC: 89 MG/DL (ref 70–110)
HCT VFR BLD AUTO: 37.5 % (ref 37–48.5)
HGB BLD-MCNC: 12.2 G/DL (ref 12–16)
IMM GRANULOCYTES # BLD AUTO: 0.02 K/UL (ref 0–0.04)
IMM GRANULOCYTES NFR BLD AUTO: 0.2 % (ref 0–0.5)
LIPASE SERPL-CCNC: 23 U/L (ref 4–60)
LYMPHOCYTES # BLD AUTO: 1.1 K/UL (ref 1–4.8)
LYMPHOCYTES NFR BLD: 13.1 % (ref 18–48)
MCH RBC QN AUTO: 29.1 PG (ref 27–31)
MCHC RBC AUTO-ENTMCNC: 32.5 G/DL (ref 32–36)
MCV RBC AUTO: 90 FL (ref 82–98)
MONOCYTES # BLD AUTO: 0.4 K/UL (ref 0.3–1)
MONOCYTES NFR BLD: 5.4 % (ref 4–15)
NEUTROPHILS # BLD AUTO: 6.6 K/UL (ref 1.8–7.7)
NEUTROPHILS NFR BLD: 80.2 % (ref 38–73)
NRBC BLD-RTO: 0 /100 WBC
PLATELET # BLD AUTO: 218 K/UL (ref 150–350)
PMV BLD AUTO: 9.3 FL (ref 9.2–12.9)
POTASSIUM SERPL-SCNC: 4 MMOL/L (ref 3.5–5.1)
PROT SERPL-MCNC: 7.2 G/DL (ref 6–8.4)
RBC # BLD AUTO: 4.19 M/UL (ref 4–5.4)
SODIUM SERPL-SCNC: 140 MMOL/L (ref 136–145)
TRIGL SERPL-MCNC: 55 MG/DL (ref 30–150)
WBC # BLD AUTO: 8.22 K/UL (ref 3.9–12.7)

## 2020-09-13 PROCEDURE — 11000001 HC ACUTE MED/SURG PRIVATE ROOM

## 2020-09-13 PROCEDURE — 63600175 PHARM REV CODE 636 W HCPCS: Performed by: INTERNAL MEDICINE

## 2020-09-13 PROCEDURE — 85025 COMPLETE CBC W/AUTO DIFF WBC: CPT

## 2020-09-13 PROCEDURE — 80053 COMPREHEN METABOLIC PANEL: CPT

## 2020-09-13 PROCEDURE — 84478 ASSAY OF TRIGLYCERIDES: CPT

## 2020-09-13 PROCEDURE — 63600175 PHARM REV CODE 636 W HCPCS: Performed by: FAMILY MEDICINE

## 2020-09-13 PROCEDURE — 99222 PR INITIAL HOSPITAL CARE,LEVL II: ICD-10-PCS | Mod: ,,, | Performed by: SURGERY

## 2020-09-13 PROCEDURE — 99222 1ST HOSP IP/OBS MODERATE 55: CPT | Mod: ,,, | Performed by: SURGERY

## 2020-09-13 PROCEDURE — 36415 COLL VENOUS BLD VENIPUNCTURE: CPT

## 2020-09-13 PROCEDURE — C9113 INJ PANTOPRAZOLE SODIUM, VIA: HCPCS | Performed by: INTERNAL MEDICINE

## 2020-09-13 PROCEDURE — 83690 ASSAY OF LIPASE: CPT

## 2020-09-13 PROCEDURE — 25000003 PHARM REV CODE 250: Performed by: FAMILY MEDICINE

## 2020-09-13 RX ORDER — KETOROLAC TROMETHAMINE 30 MG/ML
15 INJECTION, SOLUTION INTRAMUSCULAR; INTRAVENOUS EVERY 8 HOURS PRN
Status: COMPLETED | OUTPATIENT
Start: 2020-09-13 | End: 2020-09-14

## 2020-09-13 RX ORDER — PANTOPRAZOLE SODIUM 40 MG/10ML
40 INJECTION, POWDER, LYOPHILIZED, FOR SOLUTION INTRAVENOUS DAILY
Status: DISCONTINUED | OUTPATIENT
Start: 2020-09-13 | End: 2020-09-14 | Stop reason: HOSPADM

## 2020-09-13 RX ORDER — METOCLOPRAMIDE HYDROCHLORIDE 5 MG/ML
5 INJECTION INTRAMUSCULAR; INTRAVENOUS EVERY 6 HOURS PRN
Status: DISCONTINUED | OUTPATIENT
Start: 2020-09-13 | End: 2020-09-14 | Stop reason: HOSPADM

## 2020-09-13 RX ADMIN — ONDANSETRON HYDROCHLORIDE 4 MG: 2 SOLUTION INTRAMUSCULAR; INTRAVENOUS at 04:09

## 2020-09-13 RX ADMIN — ONDANSETRON HYDROCHLORIDE 4 MG: 2 SOLUTION INTRAMUSCULAR; INTRAVENOUS at 03:09

## 2020-09-13 RX ADMIN — HYDROMORPHONE HYDROCHLORIDE 2 MG: 2 INJECTION, SOLUTION INTRAMUSCULAR; INTRAVENOUS; SUBCUTANEOUS at 03:09

## 2020-09-13 RX ADMIN — SODIUM CHLORIDE 1000 ML: 0.9 INJECTION, SOLUTION INTRAVENOUS at 05:09

## 2020-09-13 RX ADMIN — SODIUM CHLORIDE 1000 ML: 0.9 INJECTION, SOLUTION INTRAVENOUS at 03:09

## 2020-09-13 RX ADMIN — KETOROLAC TROMETHAMINE 15 MG: 30 INJECTION, SOLUTION INTRAMUSCULAR at 06:09

## 2020-09-13 RX ADMIN — ERTAPENEM SODIUM 1 G: 1 INJECTION, POWDER, LYOPHILIZED, FOR SOLUTION INTRAMUSCULAR; INTRAVENOUS at 01:09

## 2020-09-13 RX ADMIN — KETOROLAC TROMETHAMINE 15 MG: 30 INJECTION, SOLUTION INTRAMUSCULAR at 10:09

## 2020-09-13 RX ADMIN — PANTOPRAZOLE SODIUM 40 MG: 40 INJECTION, POWDER, FOR SOLUTION INTRAVENOUS at 10:09

## 2020-09-13 RX ADMIN — SODIUM CHLORIDE 1000 ML: 0.9 INJECTION, SOLUTION INTRAVENOUS at 10:09

## 2020-09-13 NOTE — PROGRESS NOTES
[facility] Medicine  Progress Note    Patient Name: Diane Mcpherson  MRN: [id number]  Patient Class: IP- Inpatient   Admission Date: 9/12/2020  Length of Stay: 1 days  Attending Physician: Kemi Stephen MD  Primary Care Provider: Primary Doctor No        Subjective:     Principal Problem:Pancreatitis, acute        HPI:  24-year-old female with no significant past medical history presents to the ER for right upper quadrant abdominal pain that is been present for about the last week.  She saw a local provider at the [facility] about a week ago who thought was a pulled muscle because the pain was more localized to the back.  She was treated with muscle relaxants and anti-inflammatories which did not improve her pain.  Over the last few days it has become more constant in the right upper quadrant.  Denies any fever or chills.  Has been having normal bowel movements.  No emesis.  Right upper quadrant is exquisitely tender.  In the ER ultrasound and CT scan were performed.  Evidence of mild pangastritis in the distal pancreas.  No stone was found on the ultrasound.  Patient does have bilateral renal stones which are not obstructing.  Lipase was mildly elevated.  Case was discussed with general surgery who recommended treating as pancreatitis and having a HIDA scan on Monday.  Will admit for IV fluids, pain control, IV antibiotics.    Overview/Hospital Course:  No notes on file    Interval History:  Patient is complaining of epigastric and right upper quadrant abdominal pain radiating to the back.  Patient reports associated nausea.  Patient appears tearful. Patient denies any CP and shortness of breath.  No bowel movement reported.  Patient is afebrile.  Patient was evaluated by general surgeon who is planning to perform HIDA scan in am.    Review of Systems   Constitutional: Negative for chills and fever.   HENT: Negative for congestion and rhinorrhea.    Eyes: Negative  for visual disturbance.   Respiratory: Negative for cough and shortness of breath.    Cardiovascular: Negative for chest pain.   Gastrointestinal: Positive for abdominal pain. Negative for constipation, diarrhea, nausea and vomiting.   Genitourinary: Negative for dysuria and flank pain.   Musculoskeletal: Negative for myalgias.   Skin: Negative for color change and rash.   Neurological: Negative for weakness and headaches.   Psychiatric/Behavioral: The patient is not nervous/anxious.      Objective:     Vital Signs (Most Recent):  Temp: 96.1 °F (35.6 °C) (09/13/20 0706)  Pulse: 68 (09/13/20 0706)  Resp: 16 (09/13/20 0706)  BP: 127/79 (09/13/20 0706)  SpO2: 100 % (09/13/20 0706) Vital Signs (24h Range):  Temp:  [96.1 °F (35.6 °C)-98.2 °F (36.8 °C)] 96.1 °F (35.6 °C)  Pulse:  [64-85] 68  Resp:  [16-18] 16  SpO2:  [98 %-100 %] 100 %  BP: (119-127)/(73-87) 127/79     Weight: 57.8 kg (127 lb 6.8 oz)  Body mass index is 20.57 kg/m².    Intake/Output Summary (Last 24 hours) at 9/13/2020 1015  Last data filed at 9/13/2020 0500  Gross per 24 hour   Intake 2290 ml   Output 4 ml   Net 2286 ml      Physical Exam  Vitals signs and nursing note reviewed.   Constitutional:       General: She is not in acute distress.     Appearance: Normal appearance. She is well-developed. She is not diaphoretic.   HENT:      Head: Normocephalic and atraumatic.      Right Ear: External ear normal.      Left Ear: External ear normal.   Eyes:      General:         Right eye: No discharge.         Left eye: No discharge.   Cardiovascular:      Rate and Rhythm: Normal rate and regular rhythm.      Heart sounds: Normal heart sounds.   Pulmonary:      Effort: Pulmonary effort is normal.      Breath sounds: Normal breath sounds. No wheezing or rales.   Abdominal:      General: Abdomen is flat. Bowel sounds are normal.      Palpations: Abdomen is soft.      Tenderness: There is abdominal tenderness (Exquisitely tender in the right upper quadrant). There  is guarding and rebound. There is no right CVA tenderness or left CVA tenderness.   Skin:     General: Skin is warm and dry.   Neurological:      Mental Status: She is alert and oriented to person, place, and time. Mental status is at baseline.   Psychiatric:         Mood and Affect: Mood normal.         Behavior: Behavior normal.         Thought Content: Thought content normal.         Judgment: Judgment normal.         Significant Labs:   CBC:   Recent Labs   Lab 09/12/20  0952 09/13/20  0659   WBC 6.82 8.22   HGB 11.9* 12.2   HCT 36.4* 37.5    218     CMP:   Recent Labs   Lab 09/12/20  0952 09/13/20  0658    140   K 3.7 4.0    108   CO2 22* 22*   GLU 97 89   BUN 15 10   CREATININE 0.8 0.7   CALCIUM 9.2 8.5*   PROT 7.8 7.2   ALBUMIN 4.2 3.9   BILITOT 0.4 0.9   ALKPHOS 46* 50*   AST 19 19   ALT 15 15   ANIONGAP 16 10   EGFRNONAA >60.0 >60.0       Significant Imaging:  US abdomen: No significant sonographic abnormality involving the visualized abdomen.    CT abdomen and pelvis:  1. Bilateral non-obstructing nephrolithiasis.  2. Nonspecific prominence of the pancreatic tail, possibly due to the patient's young age.  Pancreatitis would be difficult to exclude.  No secondary imaging findings of pancreatitis.  No pseudocyst formation or pancreatic mass.      Assessment/Plan:      * Pancreatitis, acute  Continue supportive care.  Trend lipase level and follow General surgery recommendations.  Patient is scheduled for HIDA scan in the morning.  Patient cannot have intravenous narcotics due to HIDA scan 24 hr prior to procedure therefore will give Toradol 15 mg every 8 hr as needed maximum 3 doses.  Continue IV fluid hydration  On IV Invanz  Pain control  NPO for now, advanced diet as tolerated  Follow-up labs tomorrow.  Check serum triglyceride level.  HIDA scan on Monday  Follow-up general surgery recommendations, appreciate their assistance.      Primary dysthymia  Patient does not take mood  stabilizing agent.         VTE Risk Mitigation (From admission, onward)         Ordered     IP VTE LOW RISK PATIENT  Once.  Patient is active and ambulatory.      09/12/20 1435     Place sequential compression device  Until discontinued      09/12/20 1435     Place SARAH hose  Until discontinued      09/12/20 1435                Discharge Planning   NICOLE:  09-14-20    Code Status: Full Code   Is the patient medically ready for discharge?:     Reason for patient still in hospital (select all that apply): Laboratory test and Consult recommendations                     Maximo Camargo MD  Department of Hospital Medicine   [facility]

## 2020-09-13 NOTE — CONSULTS
[facility]  General Surgery  Consult Note    Patient Name: Diane Mcpherson  MRN: [id number]  Admission Date: 9/12/2020  Attending Physician: Kemi Stephen MD   Consult Physician: Dangelo Puga MD  Primary Care Provider: Primary Doctor No    Patient information was obtained from patient.     Subjective:     Reason for consultation: Abdominal pain    History of Present Illness:  Diane Mcpherson is a 24 y.o. female with a history of no past medical history presented to the ER last night with evidence of abdominal pain for 1 week duration.  Patient stated that started 1 week ago.  Epigastric/right upper quadrant abdominal pain.  Nausea no vomiting.  Nothing seemed to make the pain better or worse.  Chronic, constant.  Pain worse yesterday therefore ER visit.  In the ER patient underwent routine assessment.  Laboratory assessment showed evidence of elevation of amylase and lipase consistent with pancreatitis.  CT scan confirmed pancreatic tail pancreatitis additionally, bilateral nephrolithiasis.  UA consistent with 1+ blood in urine.  Ultrasound gallbladder negative for stones pericholecystic fluid and inflammation.  No leukocytosis.  No fevers with episode for the last week.  Patient admitted to the [facility], surgery now called in consultation.    Review of patient's allergies indicates:  No Known Allergies    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    Vaginitis      Past Surgical History:   Procedure Laterality Date    APPENDECTOMY       Family History     Problem Relation (Age of Onset)    Arthritis Mother    Ovarian cancer Maternal Aunt        Tobacco Use    Smoking status: Former Smoker    Smokeless tobacco: Never Used   Substance and Sexual Activity    Alcohol use: Yes     Frequency: Never     Comment: occ    Drug use: No    Sexual activity: Yes     Birth control/protection: I.U.D.     Review of Systems    Constitutional: Negative for activity change, appetite change, chills and fever.   HENT: Negative for congestion, dental problem and ear discharge.    Eyes: Negative for discharge and itching.   Respiratory: Negative for apnea, choking and chest tightness.    Cardiovascular: Negative for chest pain and leg swelling.   Gastrointestinal: Positive for abdominal pain. Negative for abdominal distention, anal bleeding, constipation, diarrhea and nausea.   Endocrine: Negative for cold intolerance and heat intolerance.   Genitourinary: Negative for difficulty urinating and dyspareunia.   Musculoskeletal: Negative for arthralgias and back pain.   Skin: Negative for color change and pallor.   Neurological: Negative for dizziness and facial asymmetry.   Hematological: Negative for adenopathy. Does not bruise/bleed easily.   Psychiatric/Behavioral: Negative for agitation and behavioral problems.     Objective:     Vital Signs (Most Recent):  Temp: 96.1 °F (35.6 °C) (09/13/20 0706)  Pulse: 68 (09/13/20 0706)  Resp: 16 (09/13/20 0706)  BP: 127/79 (09/13/20 0706)  SpO2: 100 % (09/13/20 0706) Vital Signs (24h Range):  Temp:  [96.1 °F (35.6 °C)-98.4 °F (36.9 °C)] 96.1 °F (35.6 °C)  Pulse:  [] 68  Resp:  [16-20] 16  SpO2:  [98 %-100 %] 100 %  BP: (119-152)/() 127/79     Weight: 57.8 kg (127 lb 6.8 oz)  Body mass index is 20.57 kg/m².    Physical Exam  Constitutional:       General: She is not in acute distress.     Appearance: She is well-developed.   HENT:      Head: Normocephalic and atraumatic.   Eyes:      Pupils: Pupils are equal, round, and reactive to light.   Neck:      Musculoskeletal: Normal range of motion and neck supple.      Thyroid: No thyromegaly.   Cardiovascular:      Rate and Rhythm: Normal rate and regular rhythm.   Pulmonary:      Effort: Pulmonary effort is normal.      Breath sounds: Normal breath sounds.   Abdominal:      General: Bowel sounds are normal. There is no distension.      Palpations:  Abdomen is soft.      Tenderness: There is abdominal tenderness in the right upper quadrant and epigastric area.   Musculoskeletal: Normal range of motion.         General: No deformity.   Skin:     General: Skin is warm.      Capillary Refill: Capillary refill takes less than 2 seconds.      Findings: No erythema.   Neurological:      Mental Status: She is alert and oriented to person, place, and time.      Cranial Nerves: No cranial nerve deficit.   Psychiatric:         Behavior: Behavior normal.         Significant Labs:  CBC:   Recent Labs   Lab 09/13/20  0659   WBC 8.22   RBC 4.19   HGB 12.2   HCT 37.5      MCV 90   MCH 29.1   MCHC 32.5     BMP:   Recent Labs   Lab 09/13/20  0658   GLU 89      K 4.0      CO2 22*   BUN 10   CREATININE 0.7   CALCIUM 8.5*     CMP:   Recent Labs   Lab 09/13/20  0658   GLU 89   CALCIUM 8.5*   ALBUMIN 3.9   PROT 7.2      K 4.0   CO2 22*      BUN 10   CREATININE 0.7   ALKPHOS 50*   ALT 15   AST 19   BILITOT 0.9     LFTs:   Recent Labs   Lab 09/13/20  0658   ALT 15   AST 19   ALKPHOS 50*   BILITOT 0.9   PROT 7.2   ALBUMIN 3.9     Coagulation: No results for input(s): LABPROT, INR, APTT in the last 168 hours.  Specimen (12h ago, onward)    None        Recent Labs   Lab 09/12/20  0939   COLORU Yellow   SPECGRAV 1.010   PHUR 6.0   PROTEINUA Negative   NITRITE Negative   LEUKOCYTESUR Negative   UROBILINOGEN Negative       Significant Diagnostics:  CT: I have reviewed all pertinent results/findings within the past 24 hours and my personal findings are:  Nephrolithiasis.  Tail pancreatitis.    Assessment:   Diane Mcpherson is a 24 y.o. female who presents with pancreatitis..    Active Diagnoses:    Diagnosis Date Noted POA    PRINCIPAL PROBLEM:  Pancreatitis, acute [K85.90] 09/12/2020 Yes      Problems Resolved During this Admission:     VTE Risk Mitigation (From admission, onward)         Ordered     IP VTE LOW RISK PATIENT  Once      09/12/20 1435     Place  sequential compression device  Until discontinued      09/12/20 1435     Place SARAH hose  Until discontinued      09/12/20 1435                Medical Decision Making/Plan:  Right upper quadrant pain and tenderness present today.  Additionally bilateral nephrolithiasis present.  Pancreatitis for sure based upon laboratory assessment however improving based on laboratory assessment.  Ultrasound negative of gallbladder for stones prasanna cholecystic fluid.  Agree with HIDA scan tomorrow.  In the meantime treat conservatively for pancreatitis with IV fluid, NPO, IV antibiotics.  Surgery to follow if gallbladder surgery necessary based upon HIDA scan.  Other management per Medicine.    Dangelo Puga MD  General Surgery  [facility]

## 2020-09-13 NOTE — SUBJECTIVE & OBJECTIVE
Interval History:  Patient is complaining of epigastric and right upper quadrant abdominal pain radiating to the back.  Patient reports associated nausea.  Patient appears tearful. Patient denies any CP and shortness of breath.  No bowel movement reported.  Patient is afebrile.  Patient was evaluated by general surgeon who is planning to perform HIDA scan in am.    Review of Systems   Constitutional: Negative for chills and fever.   HENT: Negative for congestion and rhinorrhea.    Eyes: Negative for visual disturbance.   Respiratory: Negative for cough and shortness of breath.    Cardiovascular: Negative for chest pain.   Gastrointestinal: Positive for abdominal pain. Negative for constipation, diarrhea, nausea and vomiting.   Genitourinary: Negative for dysuria and flank pain.   Musculoskeletal: Negative for myalgias.   Skin: Negative for color change and rash.   Neurological: Negative for weakness and headaches.   Psychiatric/Behavioral: The patient is not nervous/anxious.      Objective:     Vital Signs (Most Recent):  Temp: 96.1 °F (35.6 °C) (09/13/20 0706)  Pulse: 68 (09/13/20 0706)  Resp: 16 (09/13/20 0706)  BP: 127/79 (09/13/20 0706)  SpO2: 100 % (09/13/20 0706) Vital Signs (24h Range):  Temp:  [96.1 °F (35.6 °C)-98.2 °F (36.8 °C)] 96.1 °F (35.6 °C)  Pulse:  [64-85] 68  Resp:  [16-18] 16  SpO2:  [98 %-100 %] 100 %  BP: (119-127)/(73-87) 127/79     Weight: 57.8 kg (127 lb 6.8 oz)  Body mass index is 20.57 kg/m².    Intake/Output Summary (Last 24 hours) at 9/13/2020 1015  Last data filed at 9/13/2020 0500  Gross per 24 hour   Intake 2290 ml   Output 4 ml   Net 2286 ml      Physical Exam  Vitals signs and nursing note reviewed.   Constitutional:       General: She is not in acute distress.     Appearance: Normal appearance. She is well-developed. She is not diaphoretic.   HENT:      Head: Normocephalic and atraumatic.      Right Ear: External ear normal.      Left Ear: External ear normal.   Eyes:      General:          Right eye: No discharge.         Left eye: No discharge.   Cardiovascular:      Rate and Rhythm: Normal rate and regular rhythm.      Heart sounds: Normal heart sounds.   Pulmonary:      Effort: Pulmonary effort is normal.      Breath sounds: Normal breath sounds. No wheezing or rales.   Abdominal:      General: Abdomen is flat. Bowel sounds are normal.      Palpations: Abdomen is soft.      Tenderness: There is abdominal tenderness (Exquisitely tender in the right upper quadrant). There is guarding and rebound. There is no right CVA tenderness or left CVA tenderness.   Skin:     General: Skin is warm and dry.   Neurological:      Mental Status: She is alert and oriented to person, place, and time. Mental status is at baseline.   Psychiatric:         Mood and Affect: Mood normal.         Behavior: Behavior normal.         Thought Content: Thought content normal.         Judgment: Judgment normal.         Significant Labs:   CBC:   Recent Labs   Lab 09/12/20  0952 09/13/20  0659   WBC 6.82 8.22   HGB 11.9* 12.2   HCT 36.4* 37.5    218     CMP:   Recent Labs   Lab 09/12/20  0952 09/13/20  0658    140   K 3.7 4.0    108   CO2 22* 22*   GLU 97 89   BUN 15 10   CREATININE 0.8 0.7   CALCIUM 9.2 8.5*   PROT 7.8 7.2   ALBUMIN 4.2 3.9   BILITOT 0.4 0.9   ALKPHOS 46* 50*   AST 19 19   ALT 15 15   ANIONGAP 16 10   EGFRNONAA >60.0 >60.0       Significant Imaging:  US abdomen: No significant sonographic abnormality involving the visualized abdomen.    CT abdomen and pelvis:  1. Bilateral non-obstructing nephrolithiasis.  2. Nonspecific prominence of the pancreatic tail, possibly due to the patient's young age.  Pancreatitis would be difficult to exclude.  No secondary imaging findings of pancreatitis.  No pseudocyst formation or pancreatic mass.

## 2020-09-13 NOTE — ASSESSMENT & PLAN NOTE
Continue supportive care.  Trend lipase level and follow General surgery recommendations.  Patient is scheduled for HIDA scan in the morning.  Patient cannot have intravenous narcotics due to HIDA scan 24 hr prior to procedure therefore will give Toradol 15 mg every 8 hr as needed maximum 3 doses.  Continue IV fluid hydration  On IV Invanz  Pain control  NPO for now, advanced diet as tolerated  Follow-up labs tomorrow.  Check serum triglyceride level.  HIDA scan on Monday  Follow-up general surgery recommendations, appreciate their assistance.

## 2020-09-14 VITALS
BODY MASS INDEX: 20.48 KG/M2 | RESPIRATION RATE: 17 BRPM | HEIGHT: 66 IN | OXYGEN SATURATION: 98 % | TEMPERATURE: 98 F | WEIGHT: 127.44 LBS | HEART RATE: 69 BPM | SYSTOLIC BLOOD PRESSURE: 135 MMHG | DIASTOLIC BLOOD PRESSURE: 82 MMHG

## 2020-09-14 LAB
ALBUMIN SERPL BCP-MCNC: 3.3 G/DL (ref 3.5–5.2)
ALP SERPL-CCNC: 48 U/L (ref 55–135)
ALT SERPL W/O P-5'-P-CCNC: 16 U/L (ref 10–44)
ANION GAP SERPL CALC-SCNC: 17 MMOL/L (ref 8–16)
AST SERPL-CCNC: 16 U/L (ref 10–40)
BASOPHILS # BLD AUTO: 0.03 K/UL (ref 0–0.2)
BASOPHILS NFR BLD: 0.5 % (ref 0–1.9)
BILIRUB SERPL-MCNC: 1.4 MG/DL (ref 0.1–1)
BUN SERPL-MCNC: 14 MG/DL (ref 6–20)
CALCIUM SERPL-MCNC: 8.6 MG/DL (ref 8.7–10.5)
CHLORIDE SERPL-SCNC: 105 MMOL/L (ref 95–110)
CO2 SERPL-SCNC: 17 MMOL/L (ref 23–29)
CREAT SERPL-MCNC: 0.6 MG/DL (ref 0.5–1.4)
DIFFERENTIAL METHOD: ABNORMAL
EOSINOPHIL # BLD AUTO: 0.1 K/UL (ref 0–0.5)
EOSINOPHIL NFR BLD: 1.5 % (ref 0–8)
ERYTHROCYTE [DISTWIDTH] IN BLOOD BY AUTOMATED COUNT: 13.2 % (ref 11.5–14.5)
EST. GFR  (AFRICAN AMERICAN): >60 ML/MIN/1.73 M^2
EST. GFR  (NON AFRICAN AMERICAN): >60 ML/MIN/1.73 M^2
GLUCOSE SERPL-MCNC: 65 MG/DL (ref 70–110)
HCT VFR BLD AUTO: 36.6 % (ref 37–48.5)
HGB BLD-MCNC: 11.8 G/DL (ref 12–16)
IMM GRANULOCYTES # BLD AUTO: 0.01 K/UL (ref 0–0.04)
IMM GRANULOCYTES NFR BLD AUTO: 0.2 % (ref 0–0.5)
LIPASE SERPL-CCNC: 26 U/L (ref 4–60)
LYMPHOCYTES # BLD AUTO: 1.4 K/UL (ref 1–4.8)
LYMPHOCYTES NFR BLD: 23.6 % (ref 18–48)
MCH RBC QN AUTO: 28.6 PG (ref 27–31)
MCHC RBC AUTO-ENTMCNC: 32.2 G/DL (ref 32–36)
MCV RBC AUTO: 89 FL (ref 82–98)
MONOCYTES # BLD AUTO: 0.3 K/UL (ref 0.3–1)
MONOCYTES NFR BLD: 5.3 % (ref 4–15)
NEUTROPHILS # BLD AUTO: 4.1 K/UL (ref 1.8–7.7)
NEUTROPHILS NFR BLD: 68.9 % (ref 38–73)
NRBC BLD-RTO: 0 /100 WBC
PLATELET # BLD AUTO: 220 K/UL (ref 150–350)
PMV BLD AUTO: 9.2 FL (ref 9.2–12.9)
POTASSIUM SERPL-SCNC: 3.7 MMOL/L (ref 3.5–5.1)
PROT SERPL-MCNC: 6.8 G/DL (ref 6–8.4)
RBC # BLD AUTO: 4.12 M/UL (ref 4–5.4)
SODIUM SERPL-SCNC: 139 MMOL/L (ref 136–145)
WBC # BLD AUTO: 5.88 K/UL (ref 3.9–12.7)

## 2020-09-14 PROCEDURE — 85025 COMPLETE CBC W/AUTO DIFF WBC: CPT

## 2020-09-14 PROCEDURE — 99239 PR HOSPITAL DISCHARGE DAY,>30 MIN: ICD-10-PCS | Mod: ,,, | Performed by: FAMILY MEDICINE

## 2020-09-14 PROCEDURE — C9113 INJ PANTOPRAZOLE SODIUM, VIA: HCPCS | Performed by: INTERNAL MEDICINE

## 2020-09-14 PROCEDURE — 99239 HOSP IP/OBS DSCHRG MGMT >30: CPT | Mod: ,,, | Performed by: FAMILY MEDICINE

## 2020-09-14 PROCEDURE — 36415 COLL VENOUS BLD VENIPUNCTURE: CPT

## 2020-09-14 PROCEDURE — 25000003 PHARM REV CODE 250: Performed by: FAMILY MEDICINE

## 2020-09-14 PROCEDURE — 83690 ASSAY OF LIPASE: CPT

## 2020-09-14 PROCEDURE — 80053 COMPREHEN METABOLIC PANEL: CPT

## 2020-09-14 PROCEDURE — 63600175 PHARM REV CODE 636 W HCPCS: Performed by: INTERNAL MEDICINE

## 2020-09-14 RX ORDER — ONDANSETRON 4 MG/1
4 TABLET, FILM COATED ORAL EVERY 6 HOURS PRN
Qty: 30 TABLET | Refills: 2 | Status: SHIPPED | OUTPATIENT
Start: 2020-09-14

## 2020-09-14 RX ORDER — KETOROLAC TROMETHAMINE 30 MG/ML
15 INJECTION, SOLUTION INTRAMUSCULAR; INTRAVENOUS EVERY 8 HOURS PRN
Status: DISCONTINUED | OUTPATIENT
Start: 2020-09-14 | End: 2020-09-14 | Stop reason: HOSPADM

## 2020-09-14 RX ORDER — NAPROXEN 500 MG/1
500 TABLET ORAL 2 TIMES DAILY PRN
Qty: 40 TABLET | Refills: 0 | Status: ON HOLD | OUTPATIENT
Start: 2020-09-14 | End: 2020-09-28 | Stop reason: HOSPADM

## 2020-09-14 RX ORDER — HYDROCODONE BITARTRATE AND ACETAMINOPHEN 5; 325 MG/1; MG/1
1 TABLET ORAL EVERY 6 HOURS PRN
Qty: 12 TABLET | Refills: 0 | Status: SHIPPED | OUTPATIENT
Start: 2020-09-14 | End: 2020-09-30

## 2020-09-14 RX ADMIN — PANTOPRAZOLE SODIUM 40 MG: 40 INJECTION, POWDER, FOR SOLUTION INTRAVENOUS at 08:09

## 2020-09-14 RX ADMIN — SODIUM CHLORIDE 1000 ML: 0.9 INJECTION, SOLUTION INTRAVENOUS at 03:09

## 2020-09-14 RX ADMIN — KETOROLAC TROMETHAMINE 15 MG: 30 INJECTION, SOLUTION INTRAMUSCULAR at 03:09

## 2020-09-14 NOTE — PLAN OF CARE
09/14/20 0945   Discharge Assessment   Assessment Type Discharge Planning Assessment   Confirmed/corrected address and phone number on facesheet? Yes   Assessment information obtained from? Patient   Expected Length of Stay (days) 2   Communicated expected length of stay with patient/caregiver yes   Prior to hospitilization cognitive status: Alert/Oriented   Prior to hospitalization functional status: Independent   Current cognitive status: Alert/Oriented   Current Functional Status: Independent   Lives With child(joni), dependent;parent(s)   Able to Return to Prior Arrangements yes   Is patient able to care for self after discharge? Yes   Who are your caregiver(s) and their phone number(s)? Shaunna Mcpherson mother [phone]   Patient's perception of discharge disposition home or selfcare   Readmission Within the Last 30 Days no previous admission in last 30 days   Patient currently being followed by outpatient case management? No   Patient currently receives any other outside agency services? No   Equipment Currently Used at Home none   Do you have any problems affording any of your prescribed medications? No   Is the patient taking medications as prescribed? yes   Does the patient have transportation home? Yes   Transportation Anticipated family or friend will provide   Does the patient receive services at the [facility]mad[facility] Clinic? No   Discharge Plan A Home with family   DME Needed Upon Discharge  none   Patient/Family in Agreement with Plan yes   Patient lives at home with her 1 1/2yr old son & her parents & her brother. 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 States she hopes she can go home today as she is missing her baby. She does not have a PCP but would like one with [facility]. She prefers a female doctor. Denies any other needs at this time.

## 2020-09-14 NOTE — DISCHARGE SUMMARY
[facility] Medicine  Discharge Summary      Patient Name: Diane Mcpherson  MRN: [id number]  Admission Date: 9/12/2020  Hospital Length of Stay: 2 days  Discharge Date and Time:  09/14/2020 1:16 PM  Attending Physician: Kemi Stephen MD   Discharging Provider: Kemi Stephen MD  Primary Care Provider: Primary Doctor No        HPI:   24-year-old female with no significant past medical history presents to the ER for right upper quadrant abdominal pain that is been present for about the last week.  She saw a local provider at the [facility] about a week ago who thought was a pulled muscle because the pain was more localized to the back.  She was treated with muscle relaxants and anti-inflammatories which did not improve her pain.  Over the last few days it has become more constant in the right upper quadrant.  Denies any fever or chills.  Has been having normal bowel movements.  No emesis.  Right upper quadrant is exquisitely tender.  In the ER ultrasound and CT scan were performed.  Evidence of mild pangastritis in the distal pancreas.  No stone was found on the ultrasound.  Patient does have bilateral renal stones which are not obstructing.  Lipase was mildly elevated.  Case was discussed with general surgery who recommended treating as pancreatitis and having a HIDA scan on Monday.  Will admit for IV fluids, pain control, IV antibiotics.    * No surgery found *      Hospital Course:   Patient admitted and started on IV fluids, IV pain control, IV antibiotics.  Was complaining of epigastric and right upper quadrant abdominal pain radiating to the back.  General surgery consulted and felt the patient needed a HIDA scan.  Patient improved significantly overnight, much better than expected.  Was able to tolerate clear liquids.  Abdominal pain resolved.  No nausea or vomiting.  Patient able to be discharged home in stable condition which was appropriate medically and  appropriate regarding the Emergency weather situation with [place].  Outpatient HIDA scan scheduled for 3 days from discharge.  Follow-up with general surgery scheduled in 1 week.      Consults:   Consults (From admission, onward)        Status Ordering Provider     Inpatient consult to General surgery  Once     Provider:  Dangelo Puga MD    Completed LEVY MERCERTH          Final Active Diagnoses:    Diagnosis Date Noted POA    PRINCIPAL PROBLEM:  Pancreatitis, acute [K85.90] 09/12/2020 Yes    Primary dysthymia [F34.1] 04/06/2018 Yes      Problems Resolved During this Admission:      Discharged Condition: good    Disposition: Home or Self Care    Follow Up:  Follow-up Information     Nina Salinas MD. Go on 9/21/2020.    Specialty: Family Medicine  Why: appointment time: Monday Sept 21st at 11:40am for primary care follow up  Contact information:  [address] [facility] [address]  [phone]             Dangelo Puga MD. Go on 9/23/2020.    Specialty: General Surgery  Why: appointment time: Wed Sept 23rd at 2:30pm for hospital follow up   Contact information:  [address] [facility] [facility] [address]  [phone]                 Patient Instructions:      Diet full liquid   Order Comments: BLAND DIET, advance SLOWLY as tolerated     Notify your health care provider if you experience any of the following:  temperature >100.4     Notify your health care provider if you experience any of the following:  persistent nausea and vomiting or diarrhea     Activity as tolerated     Medications:  Reconciled Home Medications:      Medication List      START taking these medications    HYDROcodone-acetaminophen 5-325 mg per tablet  Commonly known as: NORCO  Take 1 tablet by mouth every 6 (six) hours as needed for Pain.     naproxen 500 MG tablet  Commonly known as: NAPROSYN  Take 1 tablet (500 mg total) by mouth 2 (two) times daily as needed (pain). Take with food      ondansetron 4 MG tablet  Commonly known as: ZOFRAN  Take 1 tablet (4 mg total) by mouth every 6 (six) hours as needed for Nausea.        CONTINUE taking these medications    etonogestreL-ethinyl estradioL 0.12-0.015 mg/24 hr vaginal ring  Commonly known as: NUVARING  Place 1 each vaginally every 21 days. Insert one (1) ring vaginally and leave in place for three (3) weeks, then remove for one (1) week.     methocarbamoL 500 MG Tab  Commonly known as: ROBAXIN  Take 500 mg by mouth 4 (four) times daily.     valACYclovir 500 MG tablet  Commonly known as: VALTREX  Take 1 tablet (500 mg total) by mouth once daily.        STOP taking these medications    ketorolac 10 mg tablet  Commonly known as: TORADOL            Significant Diagnostic Studies: Labs:   BMP:   Recent Labs   Lab 09/13/20  0658 09/14/20  0611   GLU 89 65*    139   K 4.0 3.7    105   CO2 22* 17*   BUN 10 14   CREATININE 0.7 0.6   CALCIUM 8.5* 8.6*   , CMP   Recent Labs   Lab 09/13/20  0658 09/14/20  0611    139   K 4.0 3.7    105   CO2 22* 17*   GLU 89 65*   BUN 10 14   CREATININE 0.7 0.6   CALCIUM 8.5* 8.6*   PROT 7.2 6.8   ALBUMIN 3.9 3.3*   BILITOT 0.9 1.4*   ALKPHOS 50* 48*   AST 19 16   ALT 15 16   ANIONGAP 10 17*   ESTGFRAFRICA >60.0 >60.0   EGFRNONAA >60.0 >60.0   , CBC   Recent Labs   Lab 09/13/20  0659 09/14/20  0612   WBC 8.22 5.88   HGB 12.2 11.8*   HCT 37.5 36.6*    220    and All labs within the past 24 hours have been reviewed    Pending Diagnostic Studies:     Procedure Component Value Units Date/Time    NM Hepatobiliary Scan (HIDA) [[id number]]     Order Status: Sent Lab Status: No result         Indwelling Lines/Drains at time of discharge:   Lines/Drains/Airways     None                 Time spent on the discharge of patient: 35 minutes  Patient was seen and examined on the date of discharge and determined to be suitable for discharge.         Kemi Stephen MD  Department of [facility] Medicine  [facility]

## 2020-09-14 NOTE — PLAN OF CARE
09/14/20 1045   Final Note   Assessment Type Final Discharge Note   Anticipated Discharge Disposition Home   What phone number can be called within the next 1-3 days to see how you are doing after discharge? [phone]   Hospital Follow Up  Appt(s) scheduled? Yes   Discharge plans and expectations educations in teach back method with documentation complete? Yes   Verbal & written follow up appointments with Dr Puga, Dr Salinas & outpatient HIDA scan. Provided with prep for HIDA. Demonstrated understanding by verbal feedback. States if she can tolerate the liquid diet she will go home today. Denies any other needs at this time.

## 2020-09-14 NOTE — NURSING
CC WITH DR HALL CONCERNING PTS C/O PAIN, 4/10. PT REPORTED THAT SHE DOES NOT ANUJA THE DILAUDID BUT CAN ANUJA THE TORADOL. ORDER RECEIVED TO REORDER TORADOL 15MG Q8HRS PRN PAIN.

## 2020-09-17 ENCOUNTER — HOSPITAL ENCOUNTER (OUTPATIENT)
Dept: RADIOLOGY | Facility: HOSPITAL | Age: 25
Discharge: HOME | End: 2020-09-17
Attending: INTERNAL MEDICINE
Payer: COMMERCIAL

## 2020-09-17 PROCEDURE — A9537 TC99M MEBROFENIN: HCPCS

## 2020-09-17 PROCEDURE — 78227 HEPATOBIL SYST IMAGE W/DRUG: CPT | Mod: 26,,, | Performed by: RADIOLOGY

## 2020-09-17 PROCEDURE — 78227 NM HEPATOBILIARY(HIDA) WITH PHARM AND EF: ICD-10-PCS | Mod: 26,,, | Performed by: RADIOLOGY

## 2020-09-21 ENCOUNTER — TELEPHONE (OUTPATIENT)
Dept: SURGERY | Facility: CLINIC | Age: 25
End: 2020-09-21

## 2020-09-21 ENCOUNTER — HOSPITAL ENCOUNTER (EMERGENCY)
Facility: HOSPITAL | Age: 25
Discharge: HOME | End: 2020-09-21
Attending: EMERGENCY MEDICINE
Payer: COMMERCIAL

## 2020-09-21 VITALS
OXYGEN SATURATION: 98 % | WEIGHT: 123 LBS | HEART RATE: 114 BPM | RESPIRATION RATE: 20 BRPM | HEIGHT: 66 IN | DIASTOLIC BLOOD PRESSURE: 108 MMHG | TEMPERATURE: 98 F | BODY MASS INDEX: 19.77 KG/M2 | SYSTOLIC BLOOD PRESSURE: 147 MMHG

## 2020-09-21 DIAGNOSIS — R10.9 ABDOMINAL PAIN: ICD-10-CM

## 2020-09-21 DIAGNOSIS — S29.011A INTERCOSTAL MUSCLE STRAIN, INITIAL ENCOUNTER: Primary | ICD-10-CM

## 2020-09-21 LAB
B-HCG UR QL: NEGATIVE
BILIRUB UR QL STRIP: NEGATIVE
CLARITY UR: CLEAR
COLOR UR: YELLOW
GLUCOSE UR QL STRIP: NEGATIVE
HGB UR QL STRIP: ABNORMAL
KETONES UR QL STRIP: NEGATIVE
LEUKOCYTE ESTERASE UR QL STRIP: NEGATIVE
MICROSCOPIC COMMENT: ABNORMAL
NITRITE UR QL STRIP: NEGATIVE
PH UR STRIP: 7 [PH] (ref 5–8)
PROT UR QL STRIP: NEGATIVE
RBC #/AREA URNS HPF: 25 /HPF (ref 0–4)
SP GR UR STRIP: 1.02 (ref 1–1.03)
URN SPEC COLLECT METH UR: ABNORMAL
UROBILINOGEN UR STRIP-ACNC: NEGATIVE EU/DL

## 2020-09-21 PROCEDURE — 71046 X-RAY EXAM CHEST 2 VIEWS: CPT | Mod: TC,FY

## 2020-09-21 PROCEDURE — 96372 THER/PROPH/DIAG INJ SC/IM: CPT

## 2020-09-21 PROCEDURE — 81000 URINALYSIS NONAUTO W/SCOPE: CPT

## 2020-09-21 PROCEDURE — 99284 EMERGENCY DEPT VISIT MOD MDM: CPT | Mod: 25

## 2020-09-21 PROCEDURE — 63600175 PHARM REV CODE 636 W HCPCS: Performed by: EMERGENCY MEDICINE

## 2020-09-21 PROCEDURE — 71046 X-RAY EXAM CHEST 2 VIEWS: CPT | Mod: 26,,, | Performed by: RADIOLOGY

## 2020-09-21 PROCEDURE — 81025 URINE PREGNANCY TEST: CPT

## 2020-09-21 PROCEDURE — 71046 XR CHEST PA AND LATERAL: ICD-10-PCS | Mod: 26,,, | Performed by: RADIOLOGY

## 2020-09-21 RX ORDER — KETOROLAC TROMETHAMINE 30 MG/ML
60 INJECTION, SOLUTION INTRAMUSCULAR; INTRAVENOUS
Status: COMPLETED | OUTPATIENT
Start: 2020-09-21 | End: 2020-09-21

## 2020-09-21 RX ORDER — ORPHENADRINE CITRATE 30 MG/ML
60 INJECTION INTRAMUSCULAR; INTRAVENOUS
Status: COMPLETED | OUTPATIENT
Start: 2020-09-21 | End: 2020-09-21

## 2020-09-21 RX ORDER — DEXAMETHASONE SODIUM PHOSPHATE 10 MG/ML
10 INJECTION INTRAMUSCULAR; INTRAVENOUS
Status: COMPLETED | OUTPATIENT
Start: 2020-09-21 | End: 2020-09-21

## 2020-09-21 RX ADMIN — ORPHENADRINE CITRATE 60 MG: 30 INJECTION INTRAMUSCULAR; INTRAVENOUS at 12:09

## 2020-09-21 RX ADMIN — KETOROLAC TROMETHAMINE 60 MG: 30 INJECTION, SOLUTION INTRAMUSCULAR at 12:09

## 2020-09-21 RX ADMIN — DEXAMETHASONE SODIUM PHOSPHATE 10 MG: 10 INJECTION INTRAMUSCULAR; INTRAVENOUS at 12:09

## 2020-09-21 NOTE — TELEPHONE ENCOUNTER
----- Message from Cecilia Howard sent at 9/21/2020  9:49 AM CDT -----  Type: Needs Medical Advice  Who Called:  Patient  Symptoms (please be specific):  Pain in back  Best Call Back Number:   Additional Information: Patient is due to come in Wednesday to see about her gallbladder. She says today she felt a pop and has been in pain ever since, would like to speak with the nurse about it.

## 2020-09-21 NOTE — TELEPHONE ENCOUNTER
Writer spoke to patient and advised her to report to the ER due to Dr Puga not being in clinic today. Patient expressed verbal understanding.

## 2020-09-23 ENCOUNTER — OFFICE VISIT (OUTPATIENT)
Dept: SURGERY | Facility: CLINIC | Age: 25
End: 2020-09-23
Payer: COMMERCIAL

## 2020-09-23 VITALS
WEIGHT: 127.13 LBS | HEART RATE: 76 BPM | HEIGHT: 66 IN | OXYGEN SATURATION: 97 % | BODY MASS INDEX: 20.43 KG/M2 | RESPIRATION RATE: 16 BRPM | TEMPERATURE: 97 F | SYSTOLIC BLOOD PRESSURE: 135 MMHG | DIASTOLIC BLOOD PRESSURE: 90 MMHG

## 2020-09-23 DIAGNOSIS — R10.13 EPIGASTRIC PAIN: Primary | ICD-10-CM

## 2020-09-23 PROCEDURE — 99214 OFFICE O/P EST MOD 30 MIN: CPT | Mod: S$GLB,,, | Performed by: SURGERY

## 2020-09-23 PROCEDURE — 99214 PR OFFICE/OUTPT VISIT, EST, LEVL IV, 30-39 MIN: ICD-10-PCS | Mod: S$GLB,,, | Performed by: SURGERY

## 2020-09-23 RX ORDER — SODIUM CHLORIDE 9 MG/ML
INJECTION, SOLUTION INTRAVENOUS CONTINUOUS
Status: CANCELLED | OUTPATIENT
Start: 2020-09-23

## 2020-09-23 NOTE — H&P (VIEW-ONLY)
[facility] H&P Note    Subjective:       Patient ID: Diane Mcpherson is a 24 y.o. female.    Chief Complaint:  Abdominal pain.  HPI:  Diane Mcpherson is a 24 y.o. female presents today as established patient surgery Clinic after being visited while inpatient as a consult for evaluation of right upper quadrant/right subcostal abdominal/chest pain.  No nausea vomiting.  No bloating sensations.  Patient underwent ultrasound inpatient.  Negative for cholelithiasis.  Patient underwent HIDA scan since discharge from the hospital shows normal ejection fraction of the gallbladder.  No reproduction of symptoms associated with CCK administration.  Patient was admitted underwent CT scan.  CT scan showed evidence of pancreatitis.  Patient since discharge from the hospital has done well.  Main complaint today is shoulder, right posterior, scapular, pain after feeling a pop.  Abdominal/right upper quadrant pain improving however not resolved.  No indigestion reflux disease heartburn etc.  She now presents today as established patient for follow-up.    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    Vaginitis      Past Surgical History:   Procedure Laterality Date    APPENDECTOMY       Family History   Problem Relation Age of Onset    Arthritis Mother     Ovarian cancer Maternal Aunt      Social History     Socioeconomic History    Marital status: Single     Spouse name: Not on file    Number of children: Not on file    Years of education: Not on file    Highest education level: Not on file   Occupational History    Not on file   Social Needs    Financial resource strain: Not on file    Food insecurity     Worry: Not on file     Inability: Not on file    Transportation needs     Medical: Not on file     Non-medical: Not on file   Tobacco Use    Smoking status: Former Smoker    Smokeless tobacco: Never Used   Substance and Sexual Activity    Alcohol use: Yes      Frequency: Never     Comment: occ    Drug use: No    Sexual activity: Yes     Partners: Male     Birth control/protection: I.U.D.   Lifestyle    Physical activity     Days per week: Not on file     Minutes per session: Not on file    Stress: Not on file   Relationships    Social connections     Talks on phone: Not on file     Gets together: Not on file     Attends [employer] service: Not on file     Active member of club or organization: Not on file     Attends meetings of clubs or organizations: Not on file     Relationship status: Not on file   Other Topics Concern    Not on file   Social History Narrative    Not on file       Current Outpatient Medications   Medication Sig Dispense Refill    etonogestreL-ethinyl estradioL (NUVARING) 0.12-0.015 mg/24 hr vaginal ring Place 1 each vaginally every 21 days. Insert one (1) ring vaginally and leave in place for three (3) weeks, then remove for one (1) week. 1 each 11    HYDROcodone-acetaminophen (NORCO) 5-325 mg per tablet Take 1 tablet by mouth every 6 (six) hours as needed for Pain. 12 tablet 0    naproxen (NAPROSYN) 500 MG tablet Take 1 tablet (500 mg total) by mouth 2 (two) times daily as needed (pain). Take with food 40 tablet 0    ondansetron (ZOFRAN) 4 MG tablet Take 1 tablet (4 mg total) by mouth every 6 (six) hours as needed for Nausea. 30 tablet 2    valACYclovir (VALTREX) 500 MG tablet Take 1 tablet (500 mg total) by mouth once daily. 30 tablet 11     No current facility-administered medications for this visit.      Review of patient's allergies indicates:  No Known Allergies    Review of Systems   Constitutional: Negative for activity change, appetite change, chills and fever.   HENT: Negative for congestion, dental problem and ear discharge.    Eyes: Negative for discharge and itching.   Respiratory: Negative for apnea, choking and chest tightness.    Cardiovascular: Negative for chest pain and leg swelling.   Gastrointestinal: Positive for  "abdominal pain. Negative for abdominal distention, anal bleeding, constipation, diarrhea and nausea.   Endocrine: Negative for cold intolerance and heat intolerance.   Genitourinary: Negative for difficulty urinating and dyspareunia.   Musculoskeletal: Negative for arthralgias and back pain.   Skin: Negative for color change and pallor.   Neurological: Negative for dizziness and facial asymmetry.   Hematological: Negative for adenopathy. Does not bruise/bleed easily.   Psychiatric/Behavioral: Negative for agitation and behavioral problems.       Objective:      Vitals:    09/23/20 1445   BP: (!) 135/90   BP Location: Left arm   Patient Position: Sitting   BP Method: Large (Automatic)   Pulse: 76   Resp: 16   Temp: 97.2 °F (36.2 °C)   SpO2: 97%   Weight: 57.6 kg (127 lb 1.5 oz)   Height: 5' 6" (1.676 m)     Physical Exam  Constitutional:       General: She is not in acute distress.     Appearance: She is well-developed.   HENT:      Head: Normocephalic and atraumatic.   Eyes:      Pupils: Pupils are equal, round, and reactive to light.   Neck:      Musculoskeletal: Normal range of motion and neck supple.      Thyroid: No thyromegaly.   Cardiovascular:      Rate and Rhythm: Normal rate and regular rhythm.   Pulmonary:      Effort: Pulmonary effort is normal.      Breath sounds: Normal breath sounds.   Abdominal:      General: Bowel sounds are normal. There is no distension.      Palpations: Abdomen is soft.      Tenderness: There is abdominal tenderness in the right upper quadrant.       Musculoskeletal: Normal range of motion.         General: No deformity.   Skin:     General: Skin is warm.      Capillary Refill: Capillary refill takes less than 2 seconds.      Findings: No erythema.   Neurological:      Mental Status: She is alert and oriented to person, place, and time.      Cranial Nerves: No cranial nerve deficit.   Psychiatric:         Behavior: Behavior normal.         Lab Review:   CBC:   Lab Results "   Component Value Date    WBC 5.88 09/14/2020    RBC 4.12 09/14/2020    HGB 11.8 (L) 09/14/2020    HCT 36.6 (L) 09/14/2020     09/14/2020     BMP:   Lab Results   Component Value Date    GLU 65 (L) 09/14/2020     09/14/2020    K 3.7 09/14/2020     09/14/2020    CO2 17 (L) 09/14/2020    BUN 14 09/14/2020    CREATININE 0.6 09/14/2020    CALCIUM 8.6 (L) 09/14/2020     Diagnostics Review: Ultrasound HIDA scan reviewed.     Assessment:       1. Epigastric pain        Plan:   Epigastric pain  -     Case Request Operating Room: ESOPHAGOGASTRODUODENOSCOPY (EGD)  -     Full code; Standing  -     Insert peripheral IV; Standing    Other orders  -     Progressive Mobility Protocol (mobilize patient to their highest level of functioning at least twice daily); Standing        Medical Decision Making/Counseling:  Patient with abdominal pain, mostly epigastric right upper quadrant pain.  Ultrasound HIDA scan negative.  No reproduction of symptoms with CCK.  Next steps the patient's workup will be for the patient undergo EGD.  Differential diagnosis could include peptic ulcer disease, gastritis, duodenitis, etc.  Risk benefits EGD were discussed in detail with patient clinic today.  After risk benefits discussed, patient voiced understanding risk benefits wished proceed near future.    Will obtain preoperative lab test to include CBC, CMP for review by the Anesthesiologist the day of the procedure prior to induction of the anesthetic agent of choice.    Risk and benefits of EGD were discussed in clinic in depth.  Risk of EGD were discussed to include bleeding as well as perforation.  Patient understands that if the above procedural risks were to occur, he could need further intervention to include but not exclude a blood transfusion, repeat procedure, admission to the hospital, or even surgery which would likely require transfer to a higher level of care.  Total clinic time spent today 45 minutes with greater than  half of the time spent in face to face counseling.    Patient instructed that best way to communicate with my office staff is for patient to get on the [facility] epic patient portal to expedite communication and communication issues that may occur.  Patient was given instructions on how to get on the portal.  I encouraged patient to obtain portal access as well.  Ultimately it is up to the patient to obtain access.  Patient voiced understanding.

## 2020-09-23 NOTE — H&P
[facility] H&P Note    Subjective:       Patient ID: Diane Mcpherson is a 24 y.o. female.    Chief Complaint:  Abdominal pain.  HPI:  Diane Mcpherson is a 24 y.o. female presents today as established patient surgery Clinic after being visited while inpatient as a consult for evaluation of right upper quadrant/right subcostal abdominal/chest pain.  No nausea vomiting.  No bloating sensations.  Patient underwent ultrasound inpatient.  Negative for cholelithiasis.  Patient underwent HIDA scan since discharge from the hospital shows normal ejection fraction of the gallbladder.  No reproduction of symptoms associated with CCK administration.  Patient was admitted underwent CT scan.  CT scan showed evidence of pancreatitis.  Patient since discharge from the hospital has done well.  Main complaint today is shoulder, right posterior, scapular, pain after feeling a pop.  Abdominal/right upper quadrant pain improving however not resolved.  No indigestion reflux disease heartburn etc.  She now presents today as established patient for follow-up.    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    Vaginitis      Past Surgical History:   Procedure Laterality Date    APPENDECTOMY       Family History   Problem Relation Age of Onset    Arthritis Mother     Ovarian cancer Maternal Aunt      Social History     Socioeconomic History    Marital status: Single     Spouse name: Not on file    Number of children: Not on file    Years of education: Not on file    Highest education level: Not on file   Occupational History    Not on file   Social Needs    Financial resource strain: Not on file    Food insecurity     Worry: Not on file     Inability: Not on file    Transportation needs     Medical: Not on file     Non-medical: Not on file   Tobacco Use    Smoking status: Former Smoker    Smokeless tobacco: Never Used   Substance and Sexual Activity    Alcohol use: Yes      Frequency: Never     Comment: occ    Drug use: No    Sexual activity: Yes     Partners: Male     Birth control/protection: I.U.D.   Lifestyle    Physical activity     Days per week: Not on file     Minutes per session: Not on file    Stress: Not on file   Relationships    Social connections     Talks on phone: Not on file     Gets together: Not on file     Attends [employer] service: Not on file     Active member of club or organization: Not on file     Attends meetings of clubs or organizations: Not on file     Relationship status: Not on file   Other Topics Concern    Not on file   Social History Narrative    Not on file       Current Outpatient Medications   Medication Sig Dispense Refill    etonogestreL-ethinyl estradioL (NUVARING) 0.12-0.015 mg/24 hr vaginal ring Place 1 each vaginally every 21 days. Insert one (1) ring vaginally and leave in place for three (3) weeks, then remove for one (1) week. 1 each 11    HYDROcodone-acetaminophen (NORCO) 5-325 mg per tablet Take 1 tablet by mouth every 6 (six) hours as needed for Pain. 12 tablet 0    naproxen (NAPROSYN) 500 MG tablet Take 1 tablet (500 mg total) by mouth 2 (two) times daily as needed (pain). Take with food 40 tablet 0    ondansetron (ZOFRAN) 4 MG tablet Take 1 tablet (4 mg total) by mouth every 6 (six) hours as needed for Nausea. 30 tablet 2    valACYclovir (VALTREX) 500 MG tablet Take 1 tablet (500 mg total) by mouth once daily. 30 tablet 11     No current facility-administered medications for this visit.      Review of patient's allergies indicates:  No Known Allergies    Review of Systems   Constitutional: Negative for activity change, appetite change, chills and fever.   HENT: Negative for congestion, dental problem and ear discharge.    Eyes: Negative for discharge and itching.   Respiratory: Negative for apnea, choking and chest tightness.    Cardiovascular: Negative for chest pain and leg swelling.   Gastrointestinal: Positive for  "abdominal pain. Negative for abdominal distention, anal bleeding, constipation, diarrhea and nausea.   Endocrine: Negative for cold intolerance and heat intolerance.   Genitourinary: Negative for difficulty urinating and dyspareunia.   Musculoskeletal: Negative for arthralgias and back pain.   Skin: Negative for color change and pallor.   Neurological: Negative for dizziness and facial asymmetry.   Hematological: Negative for adenopathy. Does not bruise/bleed easily.   Psychiatric/Behavioral: Negative for agitation and behavioral problems.       Objective:      Vitals:    09/23/20 1445   BP: (!) 135/90   BP Location: Left arm   Patient Position: Sitting   BP Method: Large (Automatic)   Pulse: 76   Resp: 16   Temp: 97.2 °F (36.2 °C)   SpO2: 97%   Weight: 57.6 kg (127 lb 1.5 oz)   Height: 5' 6" (1.676 m)     Physical Exam  Constitutional:       General: She is not in acute distress.     Appearance: She is well-developed.   HENT:      Head: Normocephalic and atraumatic.   Eyes:      Pupils: Pupils are equal, round, and reactive to light.   Neck:      Musculoskeletal: Normal range of motion and neck supple.      Thyroid: No thyromegaly.   Cardiovascular:      Rate and Rhythm: Normal rate and regular rhythm.   Pulmonary:      Effort: Pulmonary effort is normal.      Breath sounds: Normal breath sounds.   Abdominal:      General: Bowel sounds are normal. There is no distension.      Palpations: Abdomen is soft.      Tenderness: There is abdominal tenderness in the right upper quadrant.       Musculoskeletal: Normal range of motion.         General: No deformity.   Skin:     General: Skin is warm.      Capillary Refill: Capillary refill takes less than 2 seconds.      Findings: No erythema.   Neurological:      Mental Status: She is alert and oriented to person, place, and time.      Cranial Nerves: No cranial nerve deficit.   Psychiatric:         Behavior: Behavior normal.         Lab Review:   CBC:   Lab Results "   Component Value Date    WBC 5.88 09/14/2020    RBC 4.12 09/14/2020    HGB 11.8 (L) 09/14/2020    HCT 36.6 (L) 09/14/2020     09/14/2020     BMP:   Lab Results   Component Value Date    GLU 65 (L) 09/14/2020     09/14/2020    K 3.7 09/14/2020     09/14/2020    CO2 17 (L) 09/14/2020    BUN 14 09/14/2020    CREATININE 0.6 09/14/2020    CALCIUM 8.6 (L) 09/14/2020     Diagnostics Review: Ultrasound HIDA scan reviewed.     Assessment:       1. Epigastric pain        Plan:   Epigastric pain  -     Case Request Operating Room: ESOPHAGOGASTRODUODENOSCOPY (EGD)  -     Full code; Standing  -     Insert peripheral IV; Standing    Other orders  -     Progressive Mobility Protocol (mobilize patient to their highest level of functioning at least twice daily); Standing        Medical Decision Making/Counseling:  Patient with abdominal pain, mostly epigastric right upper quadrant pain.  Ultrasound HIDA scan negative.  No reproduction of symptoms with CCK.  Next steps the patient's workup will be for the patient undergo EGD.  Differential diagnosis could include peptic ulcer disease, gastritis, duodenitis, etc.  Risk benefits EGD were discussed in detail with patient clinic today.  After risk benefits discussed, patient voiced understanding risk benefits wished proceed near future.    Will obtain preoperative lab test to include CBC, CMP for review by the Anesthesiologist the day of the procedure prior to induction of the anesthetic agent of choice.    Risk and benefits of EGD were discussed in clinic in depth.  Risk of EGD were discussed to include bleeding as well as perforation.  Patient understands that if the above procedural risks were to occur, he could need further intervention to include but not exclude a blood transfusion, repeat procedure, admission to the hospital, or even surgery which would likely require transfer to a higher level of care.  Total clinic time spent today 45 minutes with greater than  half of the time spent in face to face counseling.    Patient instructed that best way to communicate with my office staff is for patient to get on the [facility] epic patient portal to expedite communication and communication issues that may occur.  Patient was given instructions on how to get on the portal.  I encouraged patient to obtain portal access as well.  Ultimately it is up to the patient to obtain access.  Patient voiced understanding.

## 2020-09-24 NOTE — ED PROVIDER NOTES
"Encounter Date: 9/21/2020       History     Chief Complaint   Patient presents with    Abdominal Pain     Right upper quad pain, seen here 2 weeks ago for same.     24 year old female with past medical history significant for HSV type 2, vaginitis, and undergoing outpatient workup for intermittent but persistent right upper quadrant abdominal pain presents to the ED for evaluation of continued right upper quadrant/right subcostal abdominal/chest pain that has worsened today.  States she has been managing her pain with prescribed medications; however, she is now experiencing right posterior, scapular pain after feeling a "pop" when stepping down today.  States she appreciated the pop which was followed almost immediately with new increased pain.  Denies fever, chills.  Denies nausea, vomiting, diarrhea, abdominal distension.  Denies dyspnea, cough.    Of note, the patient has undergone CT abdomen pelvis, gallbladder ultrasound, and HIDA scan that were seemingly normal.        Review of patient's allergies indicates:  No Known Allergies  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    Vaginitis      Past Surgical History:   Procedure Laterality Date    APPENDECTOMY       Family History   Problem Relation Age of Onset    Arthritis Mother     Ovarian cancer Maternal Aunt      Social History     Tobacco Use    Smoking status: Former Smoker    Smokeless tobacco: Never Used   Substance Use Topics    Alcohol use: Yes     Frequency: Never     Comment: occ    Drug use: No     Review of Systems   Constitutional: Negative for appetite change, chills, diaphoresis, fatigue and fever.   HENT: Negative for congestion, ear pain, rhinorrhea, sinus pressure, sinus pain, sore throat and tinnitus.    Eyes: Negative for photophobia and visual disturbance.   Respiratory: Negative for cough, chest tightness, shortness of breath and wheezing.    Cardiovascular: Negative for chest pain, " palpitations and leg swelling.   Gastrointestinal: Positive for abdominal pain. Negative for constipation, diarrhea, nausea and vomiting.   Endocrine: Negative for cold intolerance, heat intolerance, polydipsia, polyphagia and polyuria.   Genitourinary: Positive for flank pain. Negative for decreased urine volume, difficulty urinating, dysuria, frequency, hematuria and urgency.   Musculoskeletal: Positive for back pain. Negative for arthralgias, gait problem, joint swelling, myalgias, neck pain and neck stiffness.   Skin: Negative for color change, pallor, rash and wound.   Allergic/Immunologic: Negative for immunocompromised state.   Neurological: Negative for dizziness, syncope, weakness, light-headedness, numbness and headaches.   Hematological: Negative for adenopathy. Does not bruise/bleed easily.   Psychiatric/Behavioral: Negative for decreased concentration, dysphoric mood and sleep disturbance. The patient is not nervous/anxious.    All other systems reviewed and are negative.      Physical Exam     Initial Vitals [09/21/20 1043]   BP Pulse Resp Temp SpO2   (!) 147/108 (!) 114 20 98.3 °F (36.8 °C) 98 %      MAP       --         Physical Exam    Nursing note and vitals reviewed.  Constitutional: She appears well-developed and well-nourished. She is not diaphoretic. No distress.   HENT:   Head: Normocephalic and atraumatic.   Right Ear: External ear normal.   Left Ear: External ear normal.   Nose: Nose normal.   Mouth/Throat: Oropharynx is clear and moist.   Eyes: Conjunctivae are normal. Pupils are equal, round, and reactive to light. No scleral icterus.   Neck: Normal range of motion. Neck supple.   Cardiovascular: Regular rhythm, normal heart sounds and intact distal pulses. Tachycardia present.    Pulmonary/Chest: Breath sounds normal. No respiratory distress. She has no wheezes. She has no rhonchi. She exhibits no tenderness.   No decreased breath sounds to suggest pneumothorax   Abdominal: Soft. Bowel  sounds are normal. She exhibits no distension. There is no abdominal tenderness. There is no rebound and no guarding.   Musculoskeletal: Normal range of motion. No tenderness or edema.   Lymphadenopathy:     She has no cervical adenopathy.   Neurological: She is alert and oriented to person, place, and time. GCS score is 15. GCS eye subscore is 4. GCS verbal subscore is 5. GCS motor subscore is 6.   Skin: Skin is warm and dry. Capillary refill takes less than 2 seconds. No rash noted. No erythema. No pallor.   Psychiatric: She has a normal mood and affect. Her behavior is normal. Judgment and thought content normal.         ED Course   Procedures  Labs Reviewed   URINALYSIS, REFLEX TO URINE CULTURE - Abnormal; Notable for the following components:       Result Value    Occult Blood UA 1+ (*)     All other components within normal limits    Narrative:     Specimen Source->Urine   URINALYSIS MICROSCOPIC - Abnormal; Notable for the following components:    RBC, UA 25 (*)     All other components within normal limits    Narrative:     Specimen Source->Urine   PREGNANCY TEST, URINE RAPID    Narrative:     Specimen Source->Urine          Imaging Results          X-Ray Chest PA And Lateral (Final result)  Result time 09/21/20 11:40:00    Final result by Dillon Cordoba MD (09/21/20 11:40:00)                 Impression:      Mild pulmonary hyperinflation without focal consolidation.  This is likely secondary to inspiratory effort.      Electronically signed by: Dillon Cordoba  Date:    09/21/2020  Time:    11:40             Narrative:    EXAMINATION:  XR CHEST PA AND LATERAL    CLINICAL HISTORY:  Unspecified abdominal pain    TECHNIQUE:  PA and lateral views of the chest were performed.    COMPARISON:  None    FINDINGS:  Mild pulmonary hyperinflation.The lungs are clear, with normal appearance of pulmonary vasculature and no pleural effusion or pneumothorax.    The cardiac silhouette is normal in size. The hilar and  mediastinal contours are unremarkable.    Bones are intact.                                 Medical Decision Making:   Differential Diagnosis:   Acute cholecystitis, acute nephrolithiasis/ureterolithiasis, acute pneumothorax, pleurisy                   ED Course as of Sep 24 0206   Mon Sep 21, 2020   1043 C/o RUQ abd/rib pain since this AM; had HIDA scan 9/12 with unremarkable findings, pending apt w/ Dr Puga this Wed    I have performed the rapid medical exam (RME) portion of the medical screening exam (MSE) & have determined that this patient requires further evaluation and/or testing to determine if an emergent medical condition exists     [DH]      ED Course User Index  [DH] Seth Zepeda NP            Clinical Impression:       ICD-10-CM ICD-9-CM   1. Intercostal muscle strain, initial encounter  S29.011A 848.3   2. Abdominal pain  R10.9 789.00                      Disposition:   Disposition: Discharged  Condition: Stable     ED Disposition Condition    Discharge Stable        ED Prescriptions     None        Follow-up Information     Follow up With Specialties Details Why Contact Info    Dangelo Puga MD General Surgery Go on 9/23/2020 Please keep your scheduled appointment on Wednesday [address] [facility] [place] [address] [phone]                                         Katerina Richardson MD  09/24/20 0214

## 2020-09-25 ENCOUNTER — LAB VISIT (OUTPATIENT)
Dept: FAMILY MEDICINE | Facility: CLINIC | Age: 25
End: 2020-09-25
Payer: COMMERCIAL

## 2020-09-25 ENCOUNTER — TELEPHONE (OUTPATIENT)
Dept: SURGERY | Facility: CLINIC | Age: 25
End: 2020-09-25

## 2020-09-25 DIAGNOSIS — R10.13 EPIGASTRIC PAIN: ICD-10-CM

## 2020-09-25 PROCEDURE — U0003 INFECTIOUS AGENT DETECTION BY NUCLEIC ACID (DNA OR RNA); SEVERE ACUTE RESPIRATORY SYNDROME CORONAVIRUS 2 (SARS-COV-2) (CORONAVIRUS DISEASE [COVID-19]), AMPLIFIED PROBE TECHNIQUE, MAKING USE OF HIGH THROUGHPUT TECHNOLOGIES AS DESCRIBED BY CMS-2020-01-R: HCPCS

## 2020-09-25 NOTE — TELEPHONE ENCOUNTER
LVM for pt to call us back to discuss options for delivery of a letter to her Employer to clarify work restrictions. Pt can either  a physical copy or we can fax it if she can provide us with a Fax Number.

## 2020-09-25 NOTE — TELEPHONE ENCOUNTER
----- Message from Sonali Adan MA sent at 9/25/2020 11:44 AM CDT -----  Regarding: CB  Requesting a new back to work note per Job  Call Back #[phone]        What the Job needs for PT to return back to work ( Key info)  What light restricted means  How long PT is to be on light duty  Follow up appt.

## 2020-09-26 LAB — SARS-COV-2 RNA RESP QL NAA+PROBE: NOT DETECTED

## 2020-09-28 ENCOUNTER — ANESTHESIA EVENT (OUTPATIENT)
Dept: SURGERY | Facility: HOSPITAL | Age: 25
End: 2020-09-28
Payer: COMMERCIAL

## 2020-09-28 ENCOUNTER — ANESTHESIA (OUTPATIENT)
Dept: SURGERY | Facility: HOSPITAL | Age: 25
End: 2020-09-28
Payer: COMMERCIAL

## 2020-09-28 ENCOUNTER — HOSPITAL ENCOUNTER (OUTPATIENT)
Facility: HOSPITAL | Age: 25
Discharge: HOME | End: 2020-09-28
Attending: SURGERY | Admitting: SURGERY
Payer: COMMERCIAL

## 2020-09-28 VITALS
BODY MASS INDEX: 19.29 KG/M2 | HEART RATE: 76 BPM | TEMPERATURE: 98 F | OXYGEN SATURATION: 100 % | WEIGHT: 120 LBS | HEIGHT: 66 IN | SYSTOLIC BLOOD PRESSURE: 123 MMHG | DIASTOLIC BLOOD PRESSURE: 90 MMHG | RESPIRATION RATE: 16 BRPM

## 2020-09-28 DIAGNOSIS — R10.13 EPIGASTRIC PAIN: ICD-10-CM

## 2020-09-28 PROCEDURE — 25000003 PHARM REV CODE 250: Performed by: SURGERY

## 2020-09-28 PROCEDURE — D9220A PRA ANESTHESIA: ICD-10-PCS | Mod: CRNA,,, | Performed by: NURSE ANESTHETIST, CERTIFIED REGISTERED

## 2020-09-28 PROCEDURE — D9220A PRA ANESTHESIA: Mod: CRNA,,, | Performed by: NURSE ANESTHETIST, CERTIFIED REGISTERED

## 2020-09-28 PROCEDURE — 88305 TISSUE EXAM BY PATHOLOGIST: ICD-10-PCS | Mod: 26,,, | Performed by: PATHOLOGY

## 2020-09-28 PROCEDURE — 43239 EGD BIOPSY SINGLE/MULTIPLE: CPT | Mod: ,,, | Performed by: SURGERY

## 2020-09-28 PROCEDURE — 63600175 PHARM REV CODE 636 W HCPCS: Performed by: NURSE ANESTHETIST, CERTIFIED REGISTERED

## 2020-09-28 PROCEDURE — D9220A PRA ANESTHESIA: ICD-10-PCS | Mod: ANES,,, | Performed by: ANESTHESIOLOGY

## 2020-09-28 PROCEDURE — 37000008 HC ANESTHESIA 1ST 15 MINUTES: Performed by: SURGERY

## 2020-09-28 PROCEDURE — 25000003 PHARM REV CODE 250: Performed by: NURSE ANESTHETIST, CERTIFIED REGISTERED

## 2020-09-28 PROCEDURE — 27201423 OPTIME MED/SURG SUP & DEVICES STERILE SUPPLY: Performed by: SURGERY

## 2020-09-28 PROCEDURE — 88305 TISSUE EXAM BY PATHOLOGIST: CPT | Mod: 26,,, | Performed by: PATHOLOGY

## 2020-09-28 PROCEDURE — 37000009 HC ANESTHESIA EA ADD 15 MINS: Performed by: SURGERY

## 2020-09-28 PROCEDURE — 88305 TISSUE EXAM BY PATHOLOGIST: CPT | Mod: 59 | Performed by: PATHOLOGY

## 2020-09-28 PROCEDURE — 43239 PR EGD, FLEX, W/BIOPSY, SGL/MULTI: ICD-10-PCS | Mod: ,,, | Performed by: SURGERY

## 2020-09-28 PROCEDURE — 43239 EGD BIOPSY SINGLE/MULTIPLE: CPT | Performed by: SURGERY

## 2020-09-28 PROCEDURE — D9220A PRA ANESTHESIA: Mod: ANES,,, | Performed by: ANESTHESIOLOGY

## 2020-09-28 RX ORDER — SODIUM CHLORIDE 9 MG/ML
INJECTION, SOLUTION INTRAVENOUS CONTINUOUS
Status: DISCONTINUED | OUTPATIENT
Start: 2020-09-28 | End: 2020-09-28 | Stop reason: HOSPADM

## 2020-09-28 RX ORDER — PANTOPRAZOLE SODIUM 40 MG/1
40 TABLET, DELAYED RELEASE ORAL DAILY
Qty: 30 TABLET | Refills: 11 | Status: SHIPPED | OUTPATIENT
Start: 2020-09-28 | End: 2021-09-28

## 2020-09-28 RX ORDER — LIDOCAINE HYDROCHLORIDE 20 MG/ML
INJECTION, SOLUTION EPIDURAL; INFILTRATION; INTRACAUDAL; PERINEURAL
Status: DISCONTINUED | OUTPATIENT
Start: 2020-09-28 | End: 2020-09-28

## 2020-09-28 RX ORDER — ONDANSETRON 2 MG/ML
4 INJECTION INTRAMUSCULAR; INTRAVENOUS DAILY PRN
Status: DISCONTINUED | OUTPATIENT
Start: 2020-09-28 | End: 2020-09-28 | Stop reason: HOSPADM

## 2020-09-28 RX ORDER — SUCRALFATE 1 G/1
1 TABLET ORAL 4 TIMES DAILY
Qty: 120 TABLET | Refills: 0 | Status: SHIPPED | OUTPATIENT
Start: 2020-09-28

## 2020-09-28 RX ORDER — LIDOCAINE HYDROCHLORIDE 10 MG/ML
1 INJECTION, SOLUTION EPIDURAL; INFILTRATION; INTRACAUDAL; PERINEURAL ONCE
Status: CANCELLED | OUTPATIENT
Start: 2020-09-28 | End: 2020-09-28

## 2020-09-28 RX ORDER — PROPOFOL 10 MG/ML
VIAL (ML) INTRAVENOUS
Status: DISCONTINUED | OUTPATIENT
Start: 2020-09-28 | End: 2020-09-28

## 2020-09-28 RX ORDER — SODIUM CHLORIDE, SODIUM LACTATE, POTASSIUM CHLORIDE, CALCIUM CHLORIDE 600; 310; 30; 20 MG/100ML; MG/100ML; MG/100ML; MG/100ML
125 INJECTION, SOLUTION INTRAVENOUS CONTINUOUS
Status: DISCONTINUED | OUTPATIENT
Start: 2020-09-28 | End: 2020-09-28 | Stop reason: HOSPADM

## 2020-09-28 RX ORDER — SODIUM CHLORIDE, SODIUM LACTATE, POTASSIUM CHLORIDE, CALCIUM CHLORIDE 600; 310; 30; 20 MG/100ML; MG/100ML; MG/100ML; MG/100ML
INJECTION, SOLUTION INTRAVENOUS CONTINUOUS
Status: CANCELLED | OUTPATIENT
Start: 2020-09-28

## 2020-09-28 RX ADMIN — LIDOCAINE HYDROCHLORIDE 100 MG: 20 INJECTION, SOLUTION EPIDURAL; INFILTRATION; INTRACAUDAL; PERINEURAL at 12:09

## 2020-09-28 RX ADMIN — PROPOFOL 100 MG: 10 INJECTION, EMULSION INTRAVENOUS at 12:09

## 2020-09-28 RX ADMIN — SODIUM CHLORIDE: 0.9 INJECTION, SOLUTION INTRAVENOUS at 11:09

## 2020-09-28 RX ADMIN — PROPOFOL 50 MG: 10 INJECTION, EMULSION INTRAVENOUS at 12:09

## 2020-09-28 RX ADMIN — SODIUM CHLORIDE: 0.9 INJECTION, SOLUTION INTRAVENOUS at 12:09

## 2020-09-28 RX ADMIN — PROPOFOL 200 MG: 10 INJECTION, EMULSION INTRAVENOUS at 12:09

## 2020-09-28 NOTE — ANESTHESIA PREPROCEDURE EVALUATION
09/28/2020  Diane Mcpherson is a 24 y.o., female.    Anesthesia Evaluation    I have reviewed the Patient Summary Reports.    I have reviewed the Nursing Notes.    I have reviewed the Medications.     Review of Systems  Anesthesia Hx:  No problems with previous Anesthesia  Neg history of prior surgery. Denies Family Hx of Anesthesia complications.   Denies Personal Hx of Anesthesia complications.   Social:  Non-Smoker    Hematology/Oncology:  Hematology Normal   Oncology Normal     EENT/Dental:EENT/Dental Normal   Cardiovascular:  Cardiovascular Normal     Pulmonary:  Pulmonary Normal    Renal/:  Renal/ Normal     Hepatic/GI:  Hepatic/GI Normal    Musculoskeletal:  Musculoskeletal Normal    Neurological:  Neurology Normal    Endocrine:  Endocrine Normal    Dermatological:  Skin Normal    Psych:   Psychiatric History          Physical Exam  General:  Well nourished    Airway/Jaw/Neck:  Airway Findings: Mouth Opening: Normal Tongue: Normal  General Airway Assessment: Adult  Mallampati: I  TM Distance: 4 - 6 cm        Eyes/Ears/Nose:  EYES/EARS/NOSE FINDINGS: Normal   Dental:  DENTAL FINDINGS: Normal   Chest/Lungs:  Chest/Lungs Clear    Heart/Vascular:  Heart Findings: Normal Heart murmur: negative Vascular Findings: Normal    Abdomen:  Abdomen Findings: Normal    Musculoskeletal:  Musculoskeletal Findings: Normal   Skin:  Skin Findings: Normal    Mental Status:  Mental Status Findings: Normal        Anesthesia Plan  Type of Anesthesia, risks & benefits discussed:  Anesthesia Type:  general  Patient's Preference:   Intra-op Monitoring Plan: standard ASA monitors  Intra-op Monitoring Plan Comments:   Post Op Pain Control Plan:   Post Op Pain Control Plan Comments:   Induction:   IV  Beta Blocker:  Patient is not currently on a Beta-Blocker (No further documentation required).       Informed Consent: Patient  understands risks and agrees with Anesthesia plan.  Questions answered. Anesthesia consent signed with patient.  ASA Score: 1     Day of Surgery Review of History & Physical: I have interviewed and examined the patient. I have reviewed the patient's H&P dated:    H&P update referred to the provider.         Ready For Surgery From Anesthesia Perspective.

## 2020-09-28 NOTE — DISCHARGE SUMMARY
Discharge Note        SUMMARY     Admit Date: 9/28/2020    Attending Physician: Dangelo Puga MD     Discharge Physician: Dangelo Puga MD    Discharge Date: 9/28/2020 12:45 PM      Hospital Course: Patient tolerated procedure well.     Disposition: Home or Self Care    Patient Instructions:   Current Discharge Medication List      START taking these medications    Details   pantoprazole (PROTONIX) 40 MG tablet Take 1 tablet (40 mg total) by mouth once daily. Take in the morning before breakfast.  Wait 30 minutes before eating or drinking anything  Qty: 30 tablet, Refills: 11      sucralfate (CARAFATE) 1 gram tablet Take 1 tablet (1 g total) by mouth 4 (four) times daily.  Qty: 120 tablet, Refills: 0         CONTINUE these medications which have NOT CHANGED    Details   HYDROcodone-acetaminophen (NORCO) 5-325 mg per tablet Take 1 tablet by mouth every 6 (six) hours as needed for Pain.  Qty: 12 tablet, Refills: 0    Comments: n/a       ondansetron (ZOFRAN) 4 MG tablet Take 1 tablet (4 mg total) by mouth every 6 (six) hours as needed for Nausea.  Qty: 30 tablet, Refills: 2      valACYclovir (VALTREX) 500 MG tablet Take 1 tablet (500 mg total) by mouth once daily.  Qty: 30 tablet, Refills: 11    Associated Diagnoses: HSV-2 seropositive      etonogestreL-ethinyl estradioL (NUVARING) 0.12-0.015 mg/24 hr vaginal ring Place 1 each vaginally every 21 days. Insert one (1) ring vaginally and leave in place for three (3) weeks, then remove for one (1) week.  Qty: 1 each, Refills: 11    Associated Diagnoses: Encounter for initial prescription of vaginal ring hormonal contraceptive         STOP taking these medications       naproxen (NAPROSYN) 500 MG tablet Comments:   Reason for Stopping:               Discharge Procedure Orders (must include Diet, Follow-up, Activity):   Discharge Procedure Orders (must include Diet, Follow-up, Activity)   Diet general     Call MD for:  temperature >100.4     Call MD for:   persistent nausea and vomiting     Call MD for:  severe uncontrolled pain     Call MD for:  difficulty breathing, headache or visual disturbances     Call MD for:  redness, tenderness, or signs of infection (pain, swelling, redness, odor or green/yellow discharge around incision site)     Call MD for:  persistent dizziness or light-headedness        Follow Up:  Follow up as scheduled.  Resume routine diet.  Activity as tolerated.    No driving day of procedure.

## 2020-09-28 NOTE — PLAN OF CARE
Pt will be d/c to front of hospital, pt has to go to dr cormier office and get letter, her brother will take her from front of  Hospital to office ,

## 2020-09-28 NOTE — TRANSFER OF CARE
"Anesthesia Transfer of Care Note    Patient: Diane Mcpherson    Procedure(s) Performed: Procedure(s) (LRB):  ESOPHAGOGASTRODUODENOSCOPY (EGD) (N/A)    Patient location: PACU    Anesthesia Type: general    Transport from OR: Transported from OR on 2-3 L/min O2 by NC with adequate spontaneous ventilation    Post pain: adequate analgesia    Post assessment: no apparent anesthetic complications    Post vital signs: stable    Level of consciousness: awake, alert and oriented    Nausea/Vomiting: no nausea/vomiting    Complications: none    Transfer of care protocol was followed      Last vitals:   Visit Vitals  /84   Pulse 102   Temp 36.6 °C (97.9 °F) (Oral)   Resp 16   Ht 5' 6" (1.676 m)   Wt 54.4 kg (120 lb)   LMP 09/13/2020   SpO2 100%   Breastfeeding No   BMI 19.37 kg/m²     "

## 2020-09-28 NOTE — DISCHARGE INSTRUCTIONS
Upper GI Endoscopy     During endoscopy, a long, flexible tube is used to view the inside of your upper GI tract.      Upper GI endoscopy allows your healthcare provider to look directly into the beginning of your gastrointestinal (GI) tract. The esophagus, stomach, and duodenum (the first part of the small intestine) make up the upper GI tract.   Before the exam  Follow these and any other instructions you are given before your endoscopy. If you dont follow the healthcare providers instructions carefully, the test may need to be canceled or done over:  · Don't eat or drink anything after midnight the night before your exam. If your exam is in the afternoon, drink only clear liquids in the morning. Don't eat or drink anything for 8 hours before the exam. In some cases, you may be able to take medicines with sips of water until 2 hours before the procedure. Speak with your healthcare provider about this.   · Bring your X-rays and any other test results you have.  · Because you will be sedated, arrange for an adult to drive you home after the exam.  · Tell your healthcare provider before the exam if you are taking any medicines or have any medical problems.  The procedure  Here is what to expect:  · You will lie on the endoscopy table. Usually patients lie on the left side.  · You will be monitored and given oxygen.  · Your throat may be numbed with a spray or gargle. You are given medicine through an intravenous (IV) line that will help you relax and remain comfortable. You may be awake or asleep during the procedure.  · The healthcare provider will put the endoscope in your mouth and down your esophagus. It is thinner than most pieces of food that you swallow. It will not affect your breathing. The medicine helps keep you from gagging.  · Air is put into your GI tract to expand it. It can make you burp.  · During the procedure, the healthcare provider can take biopsies (tissue samples), remove abnormalities,  such as polyps, or treat abnormalities through a variety of devices placed through the endoscope. You will not feel this.   · The endoscope carries images of your upper GI tract to a video screen. If you are awake, you may be able to look at the images.  · After the procedure is done, you will rest for a time. An adult must drive you home.  When to call your healthcare provider  Contact your healthcare provider if you have:  · Black or tarry stools, or blood in your stool  · Fever  · Pain in your belly that does not go away  · Nausea and vomiting, or vomiting blood   Date Last Reviewed: 7/1/2016  © 8188-0407 "[employer]. [address] [place] [address], [place] [address]. All rights reserved. This information is not intended as a substitute for professional medical care. Always follow your healthcare professional's instructions.        Discharge Instructions: After Your Surgery  Youve just had surgery. During surgery, you were given medicine called anesthesia to keep you relaxed and free of pain. After surgery, you may have some pain or nausea. This is common. Here are some tips for feeling better and getting well after surgery.     Stay on schedule with your medicine.   Going home  Your healthcare provider will show you how to take care of yourself when you go home. He or she will also answer your questions. Have an adult family member or friend drive you home. For the first 24 hours after your surgery:  · Do not drive or use heavy equipment.  · Do not make important decisions or sign legal papers.  · Do not drink alcohol.  · Have someone stay with you, if needed. He or she can watch for problems and help keep you safe.  Be sure to go to all follow-up visits with your healthcare provider. And rest after your surgery for as long as your healthcare provider tells you to.  Coping with pain  If you have pain after surgery, pain medicine will help you feel better. Take it as told, before pain becomes severe. Also,  ask your healthcare provider or pharmacist about other ways to control pain. This might be with heat, ice, or relaxation. And follow any other instructions your surgeon or nurse gives you.  Tips for taking pain medicine  To get the best relief possible, remember these points:  · Pain medicines can upset your stomach. Taking them with a little food may help.  · Most pain relievers taken by mouth need at least 20 to 30 minutes to start to work.  · Taking medicine on a schedule can help you remember to take it. Try to time your medicine so that you can take it before starting an activity. This might be before you get dressed, go for a walk, or sit down for dinner.  · Constipation is a common side effect of pain medicines. Call your healthcare provider before taking any medicines such as laxatives or stool softeners to help ease constipation. Also ask if you should skip any foods. Drinking lots of fluids and eating foods such as fruits and vegetables that are high in fiber can also help. Remember, do not take laxatives unless your surgeon has prescribed them.  · Drinking alcohol and taking pain medicine can cause dizziness and slow your breathing. It can even be deadly. Do not drink alcohol while taking pain medicine.  · Pain medicine can make you react more slowly to things. Do not drive or run machinery while taking pain medicine.  Your healthcare provider may tell you to take acetaminophen to help ease your pain. Ask him or her how much you are supposed to take each day. Acetaminophen or other pain relievers may interact with your prescription medicines or other over-the-counter (OTC) medicines. Some prescription medicines have acetaminophen and other ingredients. Using both prescription and OTC acetaminophen for pain can cause you to overdose. Read the labels on your OTC medicines with care. This will help you to clearly know the list of ingredients, how much to take, and any warnings. It may also help you not take  too much acetaminophen. If you have questions or do not understand the information, ask your pharmacist or healthcare provider to explain it to you before you take the OTC medicine.  Managing nausea  Some people have an upset stomach after surgery. This is often because of anesthesia, pain, or pain medicine, or the stress of surgery. These tips will help you handle nausea and eat healthy foods as you get better. If you were on a special food plan before surgery, ask your healthcare provider if you should follow it while you get better. These tips may help:  · Do not push yourself to eat. Your body will tell you when to eat and how much.  · Start off with clear liquids and soup. They are easier to digest.  · Next try semi-solid foods, such as mashed potatoes, applesauce, and gelatin, as you feel ready.  · Slowly move to solid foods. Dont eat fatty, rich, or spicy foods at first.  · Do not force yourself to have 3 large meals a day. Instead eat smaller amounts more often.  · Take pain medicines with a small amount of solid food, such as crackers or toast, to avoid nausea.     Call your surgeon if  · You still have pain an hour after taking medicine. The medicine may not be strong enough.  · You feel too sleepy, dizzy, or groggy. The medicine may be too strong.  · You have side effects like nausea, vomiting, or skin changes, such as rash, itching, or hives.       If you have obstructive sleep apnea  You were given anesthesia medicine during surgery to keep you comfortable and free of pain. After surgery, you may have more apnea spells because of this medicine and other medicines you were given. The spells may last longer than usual.   At home:  · Keep using the continuous positive airway pressure (CPAP) device when you sleep. Unless your healthcare provider tells you not to, use it when you sleep, day or night. CPAP is a common device used to treat obstructive sleep apnea.  · Talk with your provider before taking any  pain medicine, muscle relaxants, or sedatives. Your provider will tell you about the possible dangers of taking these medicines.  Date Last Reviewed: 12/1/2016  © 7911-0136 [employer]. [address] [place] [address], [place] [address]. All rights reserved. This information is not intended as a substitute for professional medical care. Always follow your healthcare professional's instructions.

## 2020-09-28 NOTE — [employer] PATIENT INSTRUCTIONS
Discharge Summary/Instructions after an Endoscopic Procedure  Patient Name: Diane Mcpherson  Patient MRN: [id number]  Patient YOB: 1995 Monday, September 28, 2020  Dangelo Puga MD  RESTRICTIONS:  During your procedure today, you received medications for sedation.  These   medications may affect your judgment, balance and coordination.  Therefore,   for 24 hours, you have the following restrictions:   - DO NOT drive a car, operate machinery, make legal/financial decisions,   sign important papers or drink alcohol.    ACTIVITY:  Today: no heavy lifting, straining or running due to procedural   sedation/anesthesia.  The following day: return to full activity including work.  DIET:  Eat and drink normally unless instructed otherwise.     TREATMENT FOR COMMON SIDE EFFECTS:  - Mild abdominal pain, nausea, belching, bloating or excessive gas:  rest,   eat lightly and use a heating pad.  - Sore Throat: treat with throat lozenges and/or gargle with warm salt   water.  - Because air was used during the procedure, expelling large amounts of air   from your rectum or belching is normal.  - If a bowel prep was taken, you may not have a bowel movement for 1-3 days.    This is normal.  SYMPTOMS TO WATCH FOR AND REPORT TO YOUR PHYSICIAN:  1. Abdominal pain or bloating, other than gas cramps.  2. Chest pain.  3. Back pain.  4. Signs of infection such as: chills or fever occurring within 24 hours   after the procedure.  5. Rectal bleeding, which would show as bright red, maroon, or black stools.   (A tablespoon of blood from the rectum is not serious, especially if   hemorrhoids are present.)  6. Vomiting.  7. Weakness or dizziness.  GO DIRECTLY TO THE NEAREST EMERGENCY ROOM IF YOU HAVE ANY OF THE FOLLOWING:      Difficulty breathing              Chills and/or fever over 101 F   Persistent vomiting and/or vomiting blood   Severe abdominal pain   Severe chest pain   Black, tarry stools   Bleeding- more than one  tablespoon   Any other symptom or condition that you feel may need urgent attention  Your doctor recommends these additional instructions:  If any biopsies were taken, your doctors clinic will contact you in 1 to 2   weeks with any results.  - Discharge patient to home (ambulatory).   - Resume previous diet.   - Use Protonix (pantoprazole) 40 mg PO daily.   - Use sucralfate tablets 1 gram PO QID.   - Await pathology results.   - Return to my office in 2 weeks.  For questions, problems or results please call your physician - Dangelo Puga MD at Work:  [phone].  [facility] EMERGENCY ROOM PHONE NUMBER: [phone]  IF A COMPLICATION OR EMERGENCY SITUATION ARISES AND YOU ARE UNABLE TO REACH   YOUR PHYSICIAN - GO DIRECTLY TO THE EMERGENCY ROOM.  MD Dangelo Hassan MD  9/28/2020 12:48:19 PM  This report has been verified and signed electronically.  [employer]

## 2020-09-28 NOTE — ANESTHESIA POSTPROCEDURE EVALUATION
Anesthesia Post Evaluation    Patient: Diane Mcpherson    Procedure(s) Performed: Procedure(s) (LRB):  ESOPHAGOGASTRODUODENOSCOPY (EGD) (N/A)    Final Anesthesia Type: general    Patient location during evaluation: PACU  Patient participation: Yes- Able to Participate  Level of consciousness: awake and awake and alert  Post-procedure vital signs: reviewed and stable  Pain management: adequate  Airway patency: patent    PONV status at discharge: No PONV  Anesthetic complications: no      Cardiovascular status: blood pressure returned to baseline  Respiratory status: unassisted and spontaneous ventilation  Hydration status: euvolemic  Follow-up not needed.          Vitals Value Taken Time   /90 09/28/20 1335   Temp 36.5 °C (97.7 °F) 09/28/20 1252   Pulse 76 09/28/20 1335   Resp 16 09/28/20 1335   SpO2 100 % 09/28/20 1335         Event Time   Out of Recovery 13:20:00         Pain/Adam Score: Adam Score: 10 (9/28/2020  1:20 PM)  Modified Adam Score: 20 (9/28/2020  1:35 PM)

## 2020-09-30 ENCOUNTER — HOSPITAL ENCOUNTER (OUTPATIENT)
Dept: RADIOLOGY | Facility: HOSPITAL | Age: 25
Discharge: HOME | End: 2020-09-30
Attending: FAMILY MEDICINE
Payer: COMMERCIAL

## 2020-09-30 ENCOUNTER — OFFICE VISIT (OUTPATIENT)
Dept: FAMILY MEDICINE | Facility: CLINIC | Age: 25
End: 2020-09-30
Payer: COMMERCIAL

## 2020-09-30 VITALS
BODY MASS INDEX: 20.33 KG/M2 | TEMPERATURE: 98 F | SYSTOLIC BLOOD PRESSURE: 131 MMHG | OXYGEN SATURATION: 98 % | RESPIRATION RATE: 15 BRPM | HEIGHT: 66 IN | WEIGHT: 126.5 LBS | HEART RATE: 74 BPM | DIASTOLIC BLOOD PRESSURE: 86 MMHG

## 2020-09-30 DIAGNOSIS — F32.A DEPRESSION, UNSPECIFIED DEPRESSION TYPE: ICD-10-CM

## 2020-09-30 DIAGNOSIS — M54.9 DORSALGIA, UNSPECIFIED: ICD-10-CM

## 2020-09-30 DIAGNOSIS — Z76.89 ENCOUNTER TO ESTABLISH CARE WITH NEW DOCTOR: ICD-10-CM

## 2020-09-30 DIAGNOSIS — M54.41 CHRONIC RIGHT-SIDED LOW BACK PAIN WITH RIGHT-SIDED SCIATICA: Primary | ICD-10-CM

## 2020-09-30 DIAGNOSIS — G89.29 CHRONIC RIGHT-SIDED LOW BACK PAIN WITH RIGHT-SIDED SCIATICA: Primary | ICD-10-CM

## 2020-09-30 PROCEDURE — 99214 PR OFFICE/OUTPT VISIT, EST, LEVL IV, 30-39 MIN: ICD-10-PCS | Mod: S$GLB,,, | Performed by: FAMILY MEDICINE

## 2020-09-30 PROCEDURE — 72100 XR LUMBAR SPINE AP AND LATERAL: ICD-10-PCS | Mod: 26,,, | Performed by: RADIOLOGY

## 2020-09-30 PROCEDURE — 72100 X-RAY EXAM L-S SPINE 2/3 VWS: CPT | Mod: 26,,, | Performed by: RADIOLOGY

## 2020-09-30 PROCEDURE — 72100 X-RAY EXAM L-S SPINE 2/3 VWS: CPT | Mod: TC,FY

## 2020-09-30 PROCEDURE — 99214 OFFICE O/P EST MOD 30 MIN: CPT | Mod: S$GLB,,, | Performed by: FAMILY MEDICINE

## 2020-09-30 NOTE — PROGRESS NOTES
[facility] - Clinic Note    Subjective      Ms. Mcpherson is a 24 y.o. female who presents to clinic to establish care.     Patient is currently being followed by Dr. Puga for epigastric pain and found to have an ulcer.   She is currently on protonix and carafate.     She has no other significant PMH.     She complains of back pain.   Located in the  Right lower back  No radiation  When it flares, the pain will shoot down her right leg.   Flares are happening more frequently  Sharp pain  Works at the [employer] and does a lot of heavy lifting at work.   Was taking a lot of NSAIDs during the day for the pain.  Unable to take now due to ulcer.   Denies saddle anesthesia or bladder/bowel incontinence.   No recent or past trauma to the back.   Has not done PT or seen PM.      Has a history of depression.   Was on viibryd for a couple of months about 1-2 years ago. Was stopped due to insurance and financial issues.   Admits to feeling very sad, depressed, and hopeless.   Stressed at home.   She is a single mother.   Would like to get back on the medication as she states it did help her.  Denies SI/HI.    PHQ-9 PERFORMED:    Little interest or pleasure in doing things: More than half the days  Feeling down, depressed, or hopeless: Nearly every day  Trouble falling or staying asleep, or sleeping too much: Several days  Feeling tired or having little energy: More than half the days  Poor appetite or overeating: Several days  Feeling bad about yourself - or that you are a failure or have let yourself or your family down: More than half the days  Trouble concentrating on things, such as reading the newspaper or watching television: More than half the days  Moving or speaking so slowly that other people could have noticed. Or the opposite - being so fidgety or restless that you have been moving around a lot more than usual: Not at all  Thoughts that you would be better off dead, or of hurting yourself in some way: More  "than half the days  PHQ-9 Total Score: 15      PMH Diane has a past medical history of Herpes simplex virus (HSV) infection and Vaginitis.   PSXH Diane has a past surgical history that includes Appendectomy and Esophagogastroduodenoscopy (N/A, 9/28/2020).   EDA Contreras's family history includes Arthritis in her mother; Ovarian cancer in her maternal aunt.   INESSA Contreras reports that she quit smoking about 2 years ago. Her smoking use included cigarettes. She has never used smokeless tobacco. She reports current alcohol use. She reports that she does not use drugs.   LOUIS Contreras has No Known Allergies.   AIDA Contreras has a current medication list which includes the following prescription(s): ondansetron, pantoprazole, sucralfate, valacyclovir, norgestimate-ethinyl estradiol, and vilazodone.     Review of Systems   Constitutional: Negative for activity change, appetite change, chills, fatigue and fever.   Eyes: Negative for visual disturbance.   Respiratory: Negative for cough and shortness of breath.    Cardiovascular: Negative for chest pain, palpitations and leg swelling.   Gastrointestinal: Negative for abdominal pain, nausea and vomiting.   Musculoskeletal: Positive for back pain. Negative for gait problem.   Skin: Negative for wound.   Neurological: Negative for dizziness and headaches.   Psychiatric/Behavioral: Positive for decreased concentration. Negative for confusion, self-injury, sleep disturbance and suicidal ideas. The patient is nervous/anxious.      Objective     /86   Pulse 74   Temp 98.3 °F (36.8 °C) (Temporal)   Resp 15   Ht 5' 6" (1.676 m)   Wt 57.4 kg (126 lb 8 oz)   LMP 09/13/2020   SpO2 98%   BMI 20.42 kg/m²     Physical Exam   Constitutional: She appears well-developed and well-nourished. She is cooperative.  Non-toxic appearance. She does not appear ill. No distress.   HENT:   Head: Normocephalic and atraumatic.   Eyes: Right eye exhibits no discharge. Left eye exhibits no discharge. "   Neck: Neck supple.   Cardiovascular: Normal rate, regular rhythm, normal heart sounds and normal pulses. Exam reveals no gallop and no friction rub.   No murmur heard.  Pulmonary/Chest: Effort normal and breath sounds normal. No respiratory distress. She has no wheezes. She has no rhonchi. She has no rales.   Abdominal: Normal appearance.   Musculoskeletal:      Comments: Back: No effusion, erythema, ecchymosis, warmth, or deformities noted. Non-tender to palpation in the spinal area. +TTP in the right lower paraspinal region. +STL on the right. ROM intact. Strength 5/5   Lymphadenopathy:     She has no cervical adenopathy.   Neurological: She is alert.   Skin: Skin is warm and dry. Capillary refill takes less than 2 seconds. She is not diaphoretic.   Psychiatric: Her speech is normal. Memory, judgment and thought content normal. She is hyperactive. She exhibits a depressed mood.   Tearful affect   Vitals reviewed.     Assessment/Plan     Diane was seen today for establish care.    Diagnoses and all orders for this visit:  -New patient and new problem to me    Chronic right-sided low back pain with right-sided sciatica  -     Ambulatory referral/consult to Pain Clinic; Future  -     X-Ray Lumbar Spine AP And Lateral; Future    Dorsalgia, unspecified  -     X-Ray Lumbar Spine AP And Lateral; Future    Depression, unspecified depression type  -     vilazodone (VIIBRYD) 10 mg (7)- 20 mg (23) DsPk; Take 10mg tablet by mouth for 7 days and then take 20mg daily.  -     Ambulatory referral/consult to Psychology; Future    Encounter to establish care with new doctor    -follow up in 1 month    Nina Salinas MD  Family Medicine  [facility]

## 2020-10-01 ENCOUNTER — PATIENT MESSAGE (OUTPATIENT)
Dept: FAMILY MEDICINE | Facility: CLINIC | Age: 25
End: 2020-10-01

## 2020-10-01 LAB
FINAL PATHOLOGIC DIAGNOSIS: NORMAL
GROSS: NORMAL
Lab: NORMAL

## 2020-10-02 NOTE — TELEPHONE ENCOUNTER
Patient stating that her Nuvaring is no longer covered under her insurance, would like alternative, is OK with pills.     Please advised

## 2020-10-14 ENCOUNTER — OFFICE VISIT (OUTPATIENT)
Dept: SURGERY | Facility: CLINIC | Age: 25
End: 2020-10-14
Payer: COMMERCIAL

## 2020-10-14 VITALS
WEIGHT: 121.13 LBS | TEMPERATURE: 97 F | HEIGHT: 66 IN | OXYGEN SATURATION: 97 % | HEART RATE: 82 BPM | DIASTOLIC BLOOD PRESSURE: 82 MMHG | RESPIRATION RATE: 13 BRPM | SYSTOLIC BLOOD PRESSURE: 123 MMHG | BODY MASS INDEX: 19.47 KG/M2

## 2020-10-14 DIAGNOSIS — K25.9 GASTRIC ULCER, UNSPECIFIED CHRONICITY, UNSPECIFIED WHETHER GASTRIC ULCER HEMORRHAGE OR PERFORATION PRESENT: Primary | ICD-10-CM

## 2020-10-14 PROCEDURE — 99213 PR OFFICE/OUTPT VISIT, EST, LEVL III, 20-29 MIN: ICD-10-PCS | Mod: S$GLB,,, | Performed by: SURGERY

## 2020-10-14 PROCEDURE — 99213 OFFICE O/P EST LOW 20 MIN: CPT | Mod: S$GLB,,, | Performed by: SURGERY

## 2020-10-14 NOTE — PROGRESS NOTES
"[facility] General Surgery  Follow-up    Subjective:     Chief Complaint:  Abdominal pain now resolved    HPI:  Diane Mcpherson is a 24 y.o. female presents today for follow-up examination after EGD.  Patient went EGD for abdominal pain.  Patient additionally went gallbladder workup regular negative.  EGD showed evidence of numerous distal gastric superficial ulcerations and gastritis.  Biopsy showed evidence of chronic gastritis.  No evidence of H pylori.  Patient since EGD has been initiated on Protonix and Carafate therapy.  Resolution of her symptoms since institution of gastritis therapy.  She now presents today for follow-up examination.  Patient additionally admits today of utilization of Aleve for chronic pain back numerous years a couple of time    Review of Systems   Constitutional: Negative for activity change, appetite change, chills and fever.   HENT: Negative for congestion, dental problem and ear discharge.    Eyes: Negative for discharge and itching.   Respiratory: Negative for apnea, choking and chest tightness.    Cardiovascular: Negative for chest pain and leg swelling.   Gastrointestinal: Negative for abdominal distention, abdominal pain, anal bleeding, constipation, diarrhea and nausea.   Endocrine: Negative for cold intolerance and heat intolerance.   Genitourinary: Negative for difficulty urinating and dyspareunia.   Musculoskeletal: Negative for arthralgias and back pain.   Skin: Negative for color change and pallor.   Neurological: Negative for dizziness and facial asymmetry.   Hematological: Negative for adenopathy. Does not bruise/bleed easily.   Psychiatric/Behavioral: Negative for agitation and behavioral problems.       Objective:      Vitals:    10/14/20 1403   BP: 123/82   BP Location: Right arm   Patient Position: Sitting   BP Method: Large (Automatic)   Pulse: 82   Resp: 13   Temp: 97.4 °F (36.3 °C)   SpO2: 97%   Weight: 54.9 kg (121 lb 2.3 oz)   Height: 5' 6" (1.676 m)     Physical " Exam  Constitutional:       General: She is not in acute distress.     Appearance: She is well-developed.   HENT:      Head: Normocephalic and atraumatic.   Eyes:      Pupils: Pupils are equal, round, and reactive to light.   Neck:      Musculoskeletal: Normal range of motion and neck supple.      Thyroid: No thyromegaly.   Cardiovascular:      Rate and Rhythm: Normal rate and regular rhythm.   Pulmonary:      Effort: Pulmonary effort is normal.      Breath sounds: Normal breath sounds.   Abdominal:      General: Bowel sounds are normal. There is no distension.      Palpations: Abdomen is soft.      Tenderness: There is no abdominal tenderness.   Musculoskeletal: Normal range of motion.         General: No deformity.   Skin:     General: Skin is warm.      Capillary Refill: Capillary refill takes less than 2 seconds.      Findings: No erythema.   Neurological:      Mental Status: She is alert and oriented to person, place, and time.      Cranial Nerves: No cranial nerve deficit.   Psychiatric:         Behavior: Behavior normal.     EGD results reviewed.  Pathology results reviewed.  Chronic gastritis no evidence of Helicobacter pylori     Assessment:       1. Gastric ulcer, unspecified chronicity, unspecified whether gastric ulcer hemorrhage or perforation present        Plan:   Gastric ulcer, unspecified chronicity, unspecified whether gastric ulcer hemorrhage or perforation present        Medical Decision Making/Counseling:  Chronic gastritis.  Recommendation will be continuing PPI therapy for the next 6 months.  May discontinue per primary care at that time versus decreased dose.  May discontinue Carafate therapy after the month supply.  Return to surgery clinic as needed.  Abdominal pain resolved.    Follow up:  As needed    Patient instructed that best way to communicate with my office staff is for patient to get on the [facility] patient portal to expedite communication and communication issues that may  occur.  Patient was given instructions on how to get on the portal.  I encouraged patient to obtain portal access as well.  Ultimately it is up to the patient to obtain access.  Patient voiced understanding.

## 2020-10-30 ENCOUNTER — OFFICE VISIT (OUTPATIENT)
Dept: FAMILY MEDICINE | Facility: CLINIC | Age: 25
End: 2020-10-30
Payer: COMMERCIAL

## 2020-10-30 VITALS — BODY MASS INDEX: 19.69 KG/M2 | OXYGEN SATURATION: 95 % | WEIGHT: 122 LBS | HEART RATE: 87 BPM | TEMPERATURE: 97 F

## 2020-10-30 DIAGNOSIS — F32.A DEPRESSION, UNSPECIFIED DEPRESSION TYPE: ICD-10-CM

## 2020-10-30 DIAGNOSIS — Z30.011 ENCOUNTER FOR PRESCRIPTION OF ORAL CONTRACEPTIVES: Primary | ICD-10-CM

## 2020-10-30 PROCEDURE — 99214 PR OFFICE/OUTPT VISIT, EST, LEVL IV, 30-39 MIN: ICD-10-PCS | Mod: S$GLB,,, | Performed by: FAMILY MEDICINE

## 2020-10-30 PROCEDURE — 99214 OFFICE O/P EST MOD 30 MIN: CPT | Mod: S$GLB,,, | Performed by: FAMILY MEDICINE

## 2020-10-30 RX ORDER — NORGESTIMATE AND ETHINYL ESTRADIOL 0.25-0.035
1 KIT ORAL DAILY
Qty: 30 TABLET | Refills: 11 | Status: SHIPPED | OUTPATIENT
Start: 2020-10-30 | End: 2021-10-30

## 2020-10-30 NOTE — PROGRESS NOTES
[facility] [place] - Clinic Note    Subjective      Ms. Mcpherson is a 24 y.o. female who presents to clinic for OCPs.     Patient was initially seen about 1 month ago to establish care.   Was prescribed viibyrd or depression.   Today she states that she has not been able to get the medication yet do to finances. Plans to pick it up today.    Patient is requesting to change her nuva ring to OCPs.   She is followed by GYN at the [facility] and recently seen. She states that the nuva ring is expensive and no longer covered on her plan.   Has been on OCPs in the past.     PMH Diane has a past medical history of Herpes simplex virus (HSV) infection and Vaginitis.   PSXH Diane has a past surgical history that includes Appendectomy and Esophagogastroduodenoscopy (N/A, 9/28/2020).   EDA Contreras's family history includes Arthritis in her mother; Ovarian cancer in her maternal aunt.   INESSA Contreras reports that she quit smoking about 2 years ago. Her smoking use included cigarettes. She has never used smokeless tobacco. She reports current alcohol use. She reports that she does not use drugs.   LOUIS Contreras has No Known Allergies.   AIDA Contreras has a current medication list which includes the following prescription(s): ondansetron, pantoprazole, sucralfate, vilazodone, norgestimate-ethinyl estradiol, and valacyclovir.     Review of Systems   Constitutional: Negative for activity change, appetite change, chills, fatigue and fever.   Eyes: Negative for visual disturbance.   Respiratory: Negative for cough and shortness of breath.    Cardiovascular: Negative for chest pain and leg swelling.   Skin: Negative for wound.   Neurological: Negative for dizziness and headaches.   Psychiatric/Behavioral: Negative for confusion.     Objective     Pulse 87   Temp 97.3 °F (36.3 °C)   Wt 55.3 kg (122 lb)   LMP 10/12/2020 (Exact Date)   SpO2 95%   BMI 19.69 kg/m²     Physical Exam   Constitutional: She appears well-developed and  well-nourished.  Non-toxic appearance. She does not appear ill. No distress.   HENT:   Head: Normocephalic and atraumatic.   Eyes: Right eye exhibits no discharge. Left eye exhibits no discharge.   Neck: Neck supple.   Cardiovascular: Normal rate, regular rhythm, normal heart sounds and normal pulses. Exam reveals no gallop and no friction rub.   No murmur heard.  Pulmonary/Chest: Effort normal and breath sounds normal. No respiratory distress. She has no wheezes. She has no rhonchi. She has no rales.   Abdominal: Normal appearance.   Lymphadenopathy:     She has no cervical adenopathy.   Neurological: She is alert.   Skin: Skin is warm and dry. Capillary refill takes less than 2 seconds. She is not diaphoretic.   Psychiatric: Her behavior is normal. Mood, judgment and thought content normal.   Vitals reviewed.     Assessment/Plan     Diagnoses and all orders for this visit:    Encounter for prescription of oral contraceptives  -     norgestimate-ethinyl estradioL (SPRINTEC, 28,) 0.25-35 mg-mcg per tablet; Take 1 tablet by mouth once daily.    Depression, unspecified depression type  - viibryd. If expensive to let me know and will send in another anti-depressant.    -follow up in 6 weeks for depression      Future Appointments   Date Time Provider Department Center   12/14/2020 10:40 AM Nina Salinas MD [facility]   3/31/2021  9:45 AM KOFI Rooney [facility]       Nina Salinas MD  Family Medicine  [facility]

## 2022-01-11 ENCOUNTER — PATIENT MESSAGE (OUTPATIENT)
Dept: ADMINISTRATIVE | Facility: HOSPITAL | Age: 27
End: 2022-01-11
Payer: COMMERCIAL

## 2022-05-31 ENCOUNTER — PATIENT MESSAGE (OUTPATIENT)
Dept: ADMINISTRATIVE | Facility: HOSPITAL | Age: 27
End: 2022-05-31
Payer: COMMERCIAL

## 2022-11-28 ENCOUNTER — PATIENT MESSAGE (OUTPATIENT)
Dept: ADMINISTRATIVE | Facility: HOSPITAL | Age: 27
End: 2022-11-28
Payer: COMMERCIAL

## (undated) DEVICE — BITE BLOCK ADULT LATEX FREE

## (undated) DEVICE — FORCEP BIOSY RJ 4 HOT 2.2X240

## (undated) DEVICE — GLOVE PI ULTRA TOUCH G SURGEON

## (undated) DEVICE — KIT ENDO CARRY-ON PROC 100310

## (undated) DEVICE — SOL WATER STRL IRR 1000ML